# Patient Record
Sex: FEMALE | Race: WHITE
[De-identification: names, ages, dates, MRNs, and addresses within clinical notes are randomized per-mention and may not be internally consistent; named-entity substitution may affect disease eponyms.]

---

## 2018-05-20 ENCOUNTER — HOSPITAL ENCOUNTER (EMERGENCY)
Dept: HOSPITAL 97 - ER | Age: 38
Discharge: HOME | End: 2018-05-20
Payer: COMMERCIAL

## 2018-05-20 VITALS — SYSTOLIC BLOOD PRESSURE: 132 MMHG | DIASTOLIC BLOOD PRESSURE: 90 MMHG

## 2018-05-20 VITALS — TEMPERATURE: 98.3 F

## 2018-05-20 VITALS — OXYGEN SATURATION: 99 %

## 2018-05-20 DIAGNOSIS — F41.9: Primary | ICD-10-CM

## 2018-05-20 LAB
ALBUMIN SERPL BCP-MCNC: 4.8 G/DL (ref 3.2–5.5)
ALP SERPL-CCNC: 68 IU/L (ref 42–121)
ALT SERPL W P-5'-P-CCNC: 20 IU/L (ref 10–60)
AST SERPL W P-5'-P-CCNC: 25 IU/L (ref 10–42)
BUN BLD-MCNC: 14 MG/DL (ref 6–20)
GLUCOSE SERPLBLD-MCNC: 120 MG/DL (ref 65–120)
HCT VFR BLD CALC: 42.3 % (ref 36–45)
INR BLD: 1
LIPASE SERPL-CCNC: 19 U/L (ref 22–51)
LYMPHOCYTES # SPEC AUTO: 3 K/UL (ref 0.7–4.9)
MAGNESIUM SERPL-MCNC: 2.1 MG/DL (ref 1.8–2.5)
MCH RBC QN AUTO: 31 PG (ref 27–35)
MCV RBC: 93.2 FL (ref 80–100)
PMV BLD: 8.8 FL (ref 7.6–11.3)
POTASSIUM SERPL-SCNC: 3.5 MEQ/L (ref 3.6–5)
RBC # BLD: 4.53 M/UL (ref 3.86–4.86)

## 2018-05-20 PROCEDURE — 36415 COLL VENOUS BLD VENIPUNCTURE: CPT

## 2018-05-20 PROCEDURE — 85610 PROTHROMBIN TIME: CPT

## 2018-05-20 PROCEDURE — 96374 THER/PROPH/DIAG INJ IV PUSH: CPT

## 2018-05-20 PROCEDURE — 84484 ASSAY OF TROPONIN QUANT: CPT

## 2018-05-20 PROCEDURE — 93005 ELECTROCARDIOGRAM TRACING: CPT

## 2018-05-20 PROCEDURE — 83880 ASSAY OF NATRIURETIC PEPTIDE: CPT

## 2018-05-20 PROCEDURE — 85025 COMPLETE CBC W/AUTO DIFF WBC: CPT

## 2018-05-20 PROCEDURE — 81003 URINALYSIS AUTO W/O SCOPE: CPT

## 2018-05-20 PROCEDURE — 74177 CT ABD & PELVIS W/CONTRAST: CPT

## 2018-05-20 PROCEDURE — 76705 ECHO EXAM OF ABDOMEN: CPT

## 2018-05-20 PROCEDURE — 81025 URINE PREGNANCY TEST: CPT

## 2018-05-20 PROCEDURE — 99285 EMERGENCY DEPT VISIT HI MDM: CPT

## 2018-05-20 PROCEDURE — 83735 ASSAY OF MAGNESIUM: CPT

## 2018-05-20 PROCEDURE — 80048 BASIC METABOLIC PNL TOTAL CA: CPT

## 2018-05-20 PROCEDURE — 85379 FIBRIN DEGRADATION QUANT: CPT

## 2018-05-20 PROCEDURE — 80076 HEPATIC FUNCTION PANEL: CPT

## 2018-05-20 PROCEDURE — 83690 ASSAY OF LIPASE: CPT

## 2018-05-20 PROCEDURE — 71045 X-RAY EXAM CHEST 1 VIEW: CPT

## 2018-05-20 NOTE — RAD REPORT
EXAM DESCRIPTION:  Patricia Single View5/20/2018 11:43 am

 

CLINICAL HISTORY:  Chest pain

 

COMPARISON:  none

 

FINDINGS:   The lungs appear clear of acute infiltrate. The heart is normal size

 

IMPRESSION:   No acute abnormalities displayed

## 2018-05-20 NOTE — EKG
Test Date:    2018-05-20               Test Time:    11:11:27

Technician:   MEENAKSHI                                    

                                                     

MEASUREMENT RESULTS:                                       

Intervals:                                           

Rate:         85                                     

DC:           138                                    

QRSD:         70                                     

QT:           372                                    

QTc:          442                                    

Axis:                                                

P:            74                                     

DC:           138                                    

QRS:          75                                     

T:            60                                     

                                                     

INTERPRETIVE STATEMENTS:                                       

                                                     

Normal sinus rhythm

Normal ECG

No previous ECG available for comparison



Electronically Signed On 05-20-18 14:03:04 CDT by Daniel Yoon

## 2018-05-20 NOTE — ER
Nurse's Notes                                                                                     

 [facility]                                                                

Name: Dorothy Marcelnio                                                                             

Age: 37 yrs                                                                                       

Sex: Female                                                                                       

: 1980                                                                                   

MRN: [id number]                                                                                   

Arrival Date: 2018                                                                          

Time: 10:33                                                                                       

Account#: [id number]                                                                            

Bed 19                                                                                            

Private MD: Out, [facility]                                                                    

Diagnosis: Anxiety disorder, unspecified                                                          

                                                                                                  

Presentation:                                                                                     

                                                                                             

10:36 Presenting complaint: Patient states: I was recently diagnosed with MVP but I have been la1 

      having worsening SOB since then (about 2 weeks ago). Transition of care: patient was        

      not received from another setting of care. Onset of symptoms was May 20, 2018. Initial      

      Sepsis Screen: Does the patient meet any 2 criteria? No. Patient's initial sepsis           

      screen is negative. Does the patient have a suspected source of infection? No.              

      Patient's initial sepsis screen is negative. Care prior to arrival: None.                   

10:36 Method Of Arrival: Ambulatory                                                           la1 

10:36 Acuity: EVI 3                                                                           la1 

                                                                                                  

Historical:                                                                                       

- Allergies:                                                                                      

10:35 No Known Allergies;                                                                     la1 

- PMHx:                                                                                           

10:35 mvp; Anxiety;                                                                           la1 

                                                                                                  

- Immunization history:: Adult Immunizations up to date.                                          

- Social history:: Smoking status: Patient/guardian denies using tobacco.                         

                                                                                                  

                                                                                                  

Screenin:37 Abuse screen: Denies threats or abuse. Denies injuries from another. Nutritional        ch  

      screening: No deficits noted. Tuberculosis screening: No symptoms or risk factors           

      identified. Fall Risk None identified.                                                      

                                                                                                  

Assessment:                                                                                       

11:37 General: Appears in no apparent distress. comfortable, Behavior is anxious. Pain:       ch  

      Denies pain. Cardiovascular: Heart tones S1 S2 present Capillary refill < 3 seconds in      

      bilateral fingers toes Clubbing of nail beds is absent Patient's skin is warm and dry.      

      Pulses are all present. Edema is absent. Rhythm is sinus rhythm. Respiratory: Airway is     

      patent Respiratory effort is even, unlabored, Breath sounds are clear bilaterally.          

      Onset: The symptoms/episode began/occurred gradually. GI: No signs and/or symptoms were     

      reported involving the gastrointestinal system. Abdomen is round non-distended. : No      

      signs and/or symptoms were reported regarding the genitourinary system. Derm: Skin is       

      pink, warm \T\ dry.                                                                         

11:40 Reassessment: gallbladder US completed.                                                 ch  

12:47 Reassessment: Patient appears in no apparent distress at this time. Patient and/or      ch  

      family updated on plan of care and expected duration. Pain level reassessed. Patient is     

      alert, oriented x 3, equal unlabored respirations, skin warm/dry/pink. pt refuses GI        

      coctail now, states she want to know what is wrong with her. I tell pt that we still        

      have studies pending. pt transported to CT via wheelchair now. aaox4, no s/s of             

      distress. pt appears anxious, and speak rapidly. pt states she feelsslightly better.        

13:00 Reassessment: Patient appears in no apparent distress at this time. No changes from     ch  

      previously documented assessment.                                                           

14:05 Reassessment: Patient appears in no apparent distress at this time. Patient and/or      ch  

      family updated on plan of care and expected duration. Pain level reassessed. pt states      

      she wants to go home.pt medicated per orders, and discharged.                               

14:20 Reassessment: Patient appears in no apparent distress at this time. Patient and/or      ch  

      family updated on plan of care and expected duration. Pain level reassessed. Patient is     

      alert, oriented x 3, equal unlabored respirations, skin warm/dry/pink.                      

                                                                                                  

Vital Signs:                                                                                      

10:37  / 100; Pulse 85; Resp 19; Temp 97.3; Pulse Ox 100% on R/A;                       la1 

11:37 Pulse 76; Resp 14; Pulse Ox 99% on R/A; Pain 0/10;                                      ch  

12:30  / 78; Pulse 62; Resp 14; Temp 97.8; Pulse Ox 99% on R/A; Pain 0/10;              ch  

13:43  / 76; Pulse 84; Resp 16; Temp 98.3; Pulse Ox 99% on R/A; Pain 0/10;              ch  

14:05  / 90; Pulse 73; Resp 15; Temp 98.3; Pulse Ox 99% on R/A; Pain 0/10;              ch  

                                                                                                  

ED Course:                                                                                        

10:33 Patient arrived in ED.                                                                  sb2 

10:35 Out, of Town is Private Physician.                                                      sb2 

10:37 Triage completed.                                                                       la1 

10:37 Arm band placed on left wrist.                                                          la1 

10:41 Андрей Malik PA is PHCP.                                                               jr8 

10:41 Delta Rodrigez MD is Attending Physician.                                             jr8 

11:28 Initial lab(s) drawn, by me, sent to lab. EKG done, by ED staff, reviewed by Андрей MEDINA. Inserted saline lock: 20 gauge in left antecubital area, using aseptic           

      technique. Blood collected.                                                                 

11:31 Aleja Harrington, RN is Primary Nurse.                                                ch  

11:37 No apparent distress. Resting quietly.                                                  ch  

11:37 Patient has correct armband on for positive identification. Bed in low position. Call     

      light in reach. Side rails up X 1. Adult w/ patient. Cardiac monitor on. Pulse ox on.       

      NIBP on. Warm blanket given.                                                                

11:37 No provider procedures requiring assistance completed.                                  ch  

11:39 X-ray completed. Portable x-ray completed in exam room.                                 jw2 

11:40 XRAY Chest (1 view) In Process Unspecified.                                             EDMS

11:43 Ultrasound completed. Patient tolerated well.                                           sg3 

12:53 CT Abd/Pelvis - W/Contrast In Process Unspecified.                                      EDMS

12:54 CT completed. Patient tolerated procedure well. Patient moved back from CT.             cw1 

14:05 IV discontinued, intact, bleeding controlled, No redness/swelling at site. Pressure     ch  

      dressing applied.                                                                           

                                                                                                  

Administered Medications:                                                                         

13:20 Not Given (Physician Discretion): GI Cocktail without Donnatal - (Maalox Suspension 30  jr8 

      ml, Lidocaine Liquid 2 % 15 ml) PO once                                                     

13:55 Drug: Ativan 1 mg Route: IVP; Site: left antecubital;                                     

14:20 Follow up: Response: No adverse reaction; Marked relief of symptoms                     ch  

                                                                                                  

                                                                                                  

Outcome:                                                                                          

14:03 Discharge ordered by MD. limon 

14:21 Discharged to home ambulatory, with family.                                             ch  

14:21 Condition: stable                                                                           

14:21 Discharge instructions given to patient, family, Instructed on discharge instructions,      

      follow up and referral plans. Demonstrated understanding of instructions, follow-up         

      care.                                                                                       

14:21 Patient left the ED.                                                                    ch  

                                                                                                  

Signatures:                                                                                       

Dispatcher [employer]                           EDMS                                                 

Aleja Harrington, RN                  Man Fernandez ch, Crystal                             cw1                                                  

Андрей Malik PA PA   jr8                                                  

Nishant Vasquez RN                         RN   Isabelle Ballard                                jw2                                                  

Lorena Randall                               sg3                                                  

India Philippe                               sb2                                                  

                                                                                                  

Corrections: (The following items were deleted from the chart)                                    

11:47 11:47 In radiology for [facility]. EDMS                                 sg3 

                                                                                                  

**************************************************************************************************

## 2018-05-20 NOTE — RAD REPORT
EXAM DESCRIPTION:  CT - Abdomen   Pelvis W Contrast - 5/20/2018 12:53 pm

 

CLINICAL HISTORY:  Abdominal pain with nausea.

 

COMPARISON:  March 2017

 

TECHNIQUE:  Computed axial tomography of the abdomen pelvis was obtained. 100 cc Isovue-300 was admin
istered intravenously. Oral contrast was not requested which limits evaluation of bowel.

 

All CT scans are performed using dose optimization technique as appropriate and may include automated
 exposure control or mA/KV adjustment according to patient size.

 

FINDINGS:  The liver, spleen, pancreas, adrenal and kidneys appear unremarkable.

 

There is no evidence of diverticulitis.

 

IMPRESSION:  No acute abnormality is displayed.

## 2018-05-20 NOTE — EDPHYS
Physician Documentation                                                                           

 [facility]                                                                

Name: Dorothy Marcelino                                                                             

Age: 37 yrs                                                                                       

Sex: Female                                                                                       

: 1980                                                                                   

MRN: [id number]                                                                                   

Arrival Date: 2018                                                                          

Time: 10:33                                                                                       

Account#: [id number]                                                                            

Bed 19                                                                                            

Private MD: Out, [facility]                                                                    

ED Physician Delta Rodrigez                                                                      

HPI:                                                                                              

                                                                                             

11:12 This 37 yrs old  Female presents to ER via Ambulatory with complaints of       jr8 

      Shortness Of Breath.                                                                        

11:12 Onset: The symptoms/episode began/occurred gradually. Associated signs and symptoms:    jr8 

      Pertinent positives: abdominal discomfort . Severity of symptoms: At their worst the        

      symptoms were moderate in the emergency department the symptoms are unchanged. The          

      patient has not experienced similar symptoms in the past. The patient has been recently     

      seen by a physician:. Patient stated that she had originally gone to [facility] for shortness of breath. Diagnosed with MVP and anxiety. Had follow up with        

      cardiology. Echocardiogram, stress test, and angiogram was performed. MVP diagnosed but     

      of mild variance. No other acute cardiac findings noted. Given Bystolic and Klonopin.       

      Stated that she still continues to have tight bloating epigastric feeling and a sense       

      of not being able to take a deep breath along with having shortness of breath. Klonopin     

      not helping with the shortness of breath .                                                  

                                                                                                  

Historical:                                                                                       

- Allergies:                                                                                      

10:35 No Known Allergies;                                                                     la1 

- PMHx:                                                                                           

10:35 mvp; Anxiety;                                                                           la1 

                                                                                                  

- Immunization history:: Adult Immunizations up to date.                                          

- Social history:: Smoking status: Patient/guardian denies using tobacco.                         

                                                                                                  

                                                                                                  

ROS:                                                                                              

11:12 Eyes: Negative for injury, pain, redness, and discharge, ENT: Negative for injury,      jr8 

      pain, and discharge, Neck: Negative for injury, pain, and swelling, Cardiovascular:         

      Negative for chest pain, palpitations, and edema, Back: Negative for injury and pain,       

      MS/Extremity: Negative for injury and deformity, Skin: Negative for injury, rash, and       

      discoloration, Neuro: Negative for headache, weakness, numbness, tingling, and seizure.     

11:12 Respiratory: Positive for shortness of breath, Negative for cough, dyspnea on exertion,     

      hemoptysis, orthopnea, pleurisy, sputum production, wheezing.                               

11:12 Abdomen/GI: Positive for nausea, abdominal distension.                                      

                                                                                                  

Exam:                                                                                             

11:12 Eyes:  Pupils equal round and reactive to light, extra-ocular motions intact.  Lids and jr8 

      lashes normal.  Conjunctiva and sclera are non-icteric and not injected.  Cornea within     

      normal limits.  Periorbital areas with no swelling, redness, or edema. ENT:  Nares          

      patent. No nasal discharge, no septal abnormalities noted.  Tympanic membranes are          

      normal and external auditory canals are clear.  Oropharynx with no redness, swelling,       

      or masses, exudates, or evidence of obstruction, uvula midline.  Mucous membranes           

      moist. Neck:  Trachea midline, no thyromegaly or masses palpated, and no cervical           

      lymphadenopathy.  Supple, full range of motion without nuchal rigidity, or vertebral        

      point tenderness.  No Meningismus. Cardiovascular:  Regular rate and rhythm with a          

      normal S1 and S2.  No gallops, murmurs, or rubs.  Normal PMI, no JVD.  No pulse             

      deficits. Respiratory:  Lungs have equal breath sounds bilaterally, clear to                

      auscultation and percussion.  No rales, rhonchi or wheezes noted.  No increased work of     

      breathing, no retractions or nasal flaring. Back:  No spinal tenderness.  No                

      costovertebral tenderness.  Full range of motion. Skin:  Warm, dry with normal turgor.      

      Normal color with no rashes, no lesions, and no evidence of cellulitis. MS/ Extremity:      

      Pulses equal, no cyanosis.  Neurovascular intact.  Full, normal range of motion. Neuro:     

       Awake and alert, GCS 15, oriented to person, place, time, and situation.  Cranial          

      nerves II-XII grossly intact.  Motor strength 5/5 in all extremities.  Sensory grossly      

      intact.  Cerebellar exam normal.  Normal gait.                                              

11:12 Constitutional: The patient appears alert, awake, anxious.                                  

11:12 Abdomen/GI: Inspection: abdomen appears normal, Bowel sounds: active, all quadrants,        

      Palpation: soft, in all quadrants, mild abdominal tenderness, in the epigastric area        

      and right upper quadrant, mass, is not appreciated, rebound tenderness, is not              

      appreciated, voluntary guarding, is not appreciated, involuntary guarding, is not           

      appreciated, no appreciated organomegaly, Indicators: McBurney's point is not tender,       

      Gallardo's sign is negative, Rovsing's sign is negative, Obturator sign is negative,          

      Psoas sign is negative, Liver: no appreciated palpable abnormalities, tenderness, is        

      not appreciated.                                                                            

                                                                                                  

Vital Signs:                                                                                      

10:37  / 100; Pulse 85; Resp 19; Temp 97.3; Pulse Ox 100% on R/A;                       la1 

11:37 Pulse 76; Resp 14; Pulse Ox 99% on R/A; Pain 0/10;                                      ch  

12:30  / 78; Pulse 62; Resp 14; Temp 97.8; Pulse Ox 99% on R/A; Pain 0/10;              ch  

13:43  / 76; Pulse 84; Resp 16; Temp 98.3; Pulse Ox 99% on R/A; Pain 0/10;              ch  

14:05  / 90; Pulse 73; Resp 15; Temp 98.3; Pulse Ox 99% on R/A; Pain 0/10;              ch  

                                                                                                  

MDM:                                                                                              

10:41 Patient medically screened.                                                             [facility] 

14:02 Data reviewed: vital signs, nurses notes, lab test result(s), EKG, radiologic studies,  [facility] 

      CT scan, plain films, ultrasound, and as a result, I will discharge patient. Data           

      interpreted: Pulse oximetry: on room air is 99 %. Interpretation: normal. Counseling: I     

      had a detailed discussion with the patient and/or guardian regarding: the historical        

      points, exam findings, and any diagnostic results supporting the discharge/admit            

      diagnosis, lab results, radiology results, the need for outpatient follow up, a family      

      practitioner, a gastroenterologist, to return to the emergency department if symptoms       

      worsen or persist or if there are any questions or concerns that arise at home.             

                                                                                                  

                                                                                             

10:59 Order name: Basic Metabolic Panel; Complete Time: 12:08                                                                                                                              

10:59 Order name: BNP; Complete Time: 12:20                                                   [facility] 

                                                                                             

10:59 Order name: CBC with Diff; Complete Time: 11:49                                                                                                                                      

10:59 Order name: LFT's; Complete Time: 12:08                                                                                                                                              

10:59 Order name: Magnesium; Complete Time: 12:08                                                                                                                                          

10:59 Order name: PT-INR; Complete Time: 11:55                                                                                                                                             

10:59 Order name: Troponin (emerg Dept Use Only); Complete Time: 12:08                                                                                                                     

10:59 Order name: XRAY Chest (1 view); Complete Time: 12:37                                                                                                                                

10:59 Order name: Lipase; Complete Time: 12:08                                                                                                                                             

10:59 Order name: DD; Complete Time: 11:55                                                                                                                                                 

11:18 Order name: US Abdomen Limited; Complete Time: 13:08                                                                                                                                 

11:52 Order name: Urine Dipstick--Ancillary (enter results); Complete Time: 12:20             ag  

                                                                                             

11:52 Order name: Urine Pregnancy--Ancillary (enter results); Complete Time: 12:20            ag  

                                                                                             

12:10 Order name: CT Abd/Pelvis - W/Contrast; Complete Time: 13:                                                                                             

10:59 Order name: EKG; Complete Time: 10:59                                                                                                                                                

10:59 Order name: Cardiac monitoring; Complete Time: 11:37                                                                                                                                 

10:59 Order name: EKG - Nurse/Tech; Complete Time: 11:37                                                                                                                                   

10:59 Order name: IV Saline Lock; Complete Time: 11:37                                                                                                                                     

10:59 Order name: Labs collected and sent; Complete Time: 11:37                                                                                                                            

10:59 Order name: O2 Per Protocol; Complete Time: 11:37                                                                                                                                    

10:59 Order name: O2 Sat Monitoring; Complete Time: 11:37                                                                                                                                  

10:59 Order name: Urine Dipstick-Ancillary (obtain specimen); Complete Time: 11:58                                                                                                         

10:59 Order name: Urine Pregnancy Test (obtain specimen); Complete Time: 11:58                jr8 

                                                                                                  

Administered Medications:                                                                         

13:20 Not Given (Physician Discretion): GI Cocktail without Donnatal - (Maalox Suspension 30  jr8 

      ml, Lidocaine Liquid 2 % 15 ml) PO once                                                     

13:55 Drug: Ativan 1 mg Route: IVP; Site: left antecubital;                                     

14:20 Follow up: Response: No adverse reaction; Marked relief of symptoms                     ch  

                                                                                                  

                                                                                                  

Disposition:                                                                                      

                                                                                             

09:18 Co-signature as Attending Physician, Delta Rodrigez MD I agree with the assessment and  garima 

      plan of care.                                                                               

                                                                                                  

Disposition:                                                                                      

18 14:03 Discharged to Home. Impression: Anxiety disorder, unspecified.                     

- Condition is Stable.                                                                            

- Discharge Instructions: Panic Attacks, Generalized Anxiety Disorder.                            

                                                                                                  

- Medication Reconciliation Form, Thank You Letter, Antibiotic Education, Prescription            

  Opioid Use form.                                                                                

- Follow up: Private Physician; When: 2 - 3 days; Reason: Recheck today's complaints,             

  Continuance of care, Re-evaluation by your physician.                                           

- Problem is new.                                                                                 

- Symptoms have improved.                                                                         

                                                                                                  

                                                                                                  

                                                                                                  

Signatures:                                                                                       

Dispatcher [employer]                           Aleja Keating, RN                  RN   Delta Altamirano MD MD cha Roszak, Josh, PA PA   jr8                                                  

Nishant Vasquez RN                         RN   la1                                                  

                                                                                                  

Corrections: (The following items were deleted from the chart)                                    

                                                                                             

14:21 14:03 2018 14:03 Discharged to Home. Impression: Anxiety disorder, unspecified.     

      Condition is Stable. Forms are Medication Reconciliation Form, Thank You Letter,            

      Antibiotic Education, Prescription Opioid Use. Follow up: Private Physician; When: 2 -      

      3 days; Reason: Recheck today's complaints, Continuance of care, Re-evaluation by your      

      physician. Problem is new. Symptoms have improved. jr8                                      

                                                                                                  

**************************************************************************************************

## 2018-05-20 NOTE — RAD REPORT
EXAM DESCRIPTION:  US - Abdomen Exam Limited - 5/20/2018 11:47 am

 

CLINICAL HISTORY:  Abdominal pain.

 

COMPARISON:  None.

 

FINDINGS:   The gallbladder wall is not thickened. A gallstone is not seen.

 

The biliary tree is normal caliber.

 

IMPRESSION:  Unremarkable gallbladder ultrasound.

## 2019-07-29 LAB
HCT VFR BLD CALC: 41.5 % (ref 36–45)
LYMPHOCYTES # SPEC AUTO: 2.2 K/UL (ref 0.7–4.9)
PMV BLD: 9.3 FL (ref 7.6–11.3)
RBC # BLD: 4.35 M/UL (ref 3.86–4.86)
UA COMPLETE W REFLEX CULTURE PNL UR: (no result)
UA DIPSTICK W REFLEX MICRO PNL UR: (no result)

## 2019-07-31 ENCOUNTER — HOSPITAL ENCOUNTER (OUTPATIENT)
Dept: HOSPITAL 97 - OR | Age: 39
Discharge: HOME | End: 2019-07-31
Attending: OBSTETRICS & GYNECOLOGY
Payer: COMMERCIAL

## 2019-07-31 VITALS — OXYGEN SATURATION: 100 % | DIASTOLIC BLOOD PRESSURE: 65 MMHG | SYSTOLIC BLOOD PRESSURE: 95 MMHG | TEMPERATURE: 98.2 F

## 2019-07-31 DIAGNOSIS — I34.1: ICD-10-CM

## 2019-07-31 DIAGNOSIS — F41.9: ICD-10-CM

## 2019-07-31 DIAGNOSIS — N83.8: ICD-10-CM

## 2019-07-31 DIAGNOSIS — M54.5: ICD-10-CM

## 2019-07-31 DIAGNOSIS — N94.6: ICD-10-CM

## 2019-07-31 DIAGNOSIS — N80.0: Primary | ICD-10-CM

## 2019-07-31 DIAGNOSIS — N92.0: ICD-10-CM

## 2019-07-31 DIAGNOSIS — F17.210: ICD-10-CM

## 2019-07-31 DIAGNOSIS — Z79.899: ICD-10-CM

## 2019-07-31 DIAGNOSIS — F32.9: ICD-10-CM

## 2019-07-31 DIAGNOSIS — N93.9: ICD-10-CM

## 2019-07-31 DIAGNOSIS — N73.6: ICD-10-CM

## 2019-07-31 PROCEDURE — 36415 COLL VENOUS BLD VENIPUNCTURE: CPT

## 2019-07-31 PROCEDURE — 88302 TISSUE EXAM BY PATHOLOGIST: CPT

## 2019-07-31 PROCEDURE — 86901 BLOOD TYPING SEROLOGIC RH(D): CPT

## 2019-07-31 PROCEDURE — 58661 LAPAROSCOPY REMOVE ADNEXA: CPT

## 2019-07-31 PROCEDURE — 49321 LAPAROSCOPY BIOPSY: CPT

## 2019-07-31 PROCEDURE — 85025 COMPLETE CBC W/AUTO DIFF WBC: CPT

## 2019-07-31 PROCEDURE — 88305 TISSUE EXAM BY PATHOLOGIST: CPT

## 2019-07-31 PROCEDURE — 0UJD8ZZ INSPECTION OF UTERUS AND CERVIX, VIA NATURAL OR ARTIFICIAL OPENING ENDOSCOPIC: ICD-10-PCS

## 2019-07-31 PROCEDURE — 58558 HYSTEROSCOPY BIOPSY: CPT

## 2019-07-31 PROCEDURE — 81015 MICROSCOPIC EXAM OF URINE: CPT

## 2019-07-31 PROCEDURE — 81003 URINALYSIS AUTO W/O SCOPE: CPT

## 2019-07-31 PROCEDURE — 86850 RBC ANTIBODY SCREEN: CPT

## 2019-07-31 PROCEDURE — 0UDB7ZX EXTRACTION OF ENDOMETRIUM, VIA NATURAL OR ARTIFICIAL OPENING, DIAGNOSTIC: ICD-10-PCS

## 2019-07-31 PROCEDURE — 0WBF4ZX EXCISION OF ABDOMINAL WALL, PERCUTANEOUS ENDOSCOPIC APPROACH, DIAGNOSTIC: ICD-10-PCS

## 2019-07-31 PROCEDURE — 0UT64ZZ RESECTION OF LEFT FALLOPIAN TUBE, PERCUTANEOUS ENDOSCOPIC APPROACH: ICD-10-PCS

## 2019-07-31 PROCEDURE — 86900 BLOOD TYPING SEROLOGIC ABO: CPT

## 2019-07-31 PROCEDURE — 81025 URINE PREGNANCY TEST: CPT

## 2019-07-31 RX ADMIN — HYDROCODONE BITARTRATE AND ACETAMINOPHEN ONE TAB: 5; 325 TABLET ORAL at 12:24

## 2019-07-31 RX ADMIN — HYDROMORPHONE HYDROCHLORIDE ONE MG: 1 INJECTION, SOLUTION INTRAMUSCULAR; INTRAVENOUS; SUBCUTANEOUS at 09:47

## 2019-07-31 RX ADMIN — HYDROMORPHONE HYDROCHLORIDE ONE MG: 1 INJECTION, SOLUTION INTRAMUSCULAR; INTRAVENOUS; SUBCUTANEOUS at 09:52

## 2019-07-31 RX ADMIN — HYDROMORPHONE HYDROCHLORIDE ONE MG: 1 INJECTION, SOLUTION INTRAMUSCULAR; INTRAVENOUS; SUBCUTANEOUS at 10:20

## 2019-07-31 RX ADMIN — HYDROCODONE BITARTRATE AND ACETAMINOPHEN ONE TAB: 5; 325 TABLET ORAL at 11:00

## 2019-07-31 RX ADMIN — HYDROMORPHONE HYDROCHLORIDE ONE MG: 1 INJECTION, SOLUTION INTRAMUSCULAR; INTRAVENOUS; SUBCUTANEOUS at 10:18

## 2019-07-31 NOTE — XMS REPORT
Patient Summary Document

 Created on:2019



Patient:LEXI WALKER

Sex:Female

:1980

External Reference #:[id number]





Demographics







 Address  [address] [place] [address]

 

 Home Phone  [phone]

 

 Work Phone  [phone]

 

 Email Address  DECLINED

 

 Preferred Language  Unknown

 

 Marital Status  Unknown

 

 Mandaeism Affiliation  Unknown

 

 Race  Unknown

 

 Additional Race(s)  Unavailable

 

 Ethnic Group  Unknown









Author







 Organization  [facility]

 

 Address  [address]



   [place] [address]

 

 Phone  [phone]









Care Team Providers







 Name  Role  Phone

 

 Unavailable  Unavailable  Unavailable









Problems

This patient has no known problems.



Allergies, Adverse Reactions, Alerts

This patient has no known allergies or adverse reactions.



Medications

This patient has no known medications.



Results







 Test Description  Test Time  Test Comments  Text Results  Atomic Results  
Result Comments









 Automated Differential  2019 23:01:16    









   Test Item  Value  Reference Range  Comments









 Neutro Auto (test code=Neutro Auto)  71.9 %  36.0-70.0  

 

 Lymph Auto (test code=Lymph Auto)  22.8 %  12.0-44.0  

 

 Mono Auto (test code=Mono Auto)  4.4 %  0.0-11.0  

 

 Eos, Auto (test code=Eos, Auto)  0.2 %  0.0-7.0  

 

 Basophil Auto (test code=Basophil Auto)  0.5 %  0.0-2.0  

 

 Neutro Absolute (test code=Neutro Absolute)  6.4 x10  1.6-7.4  

 

 Lymph Absolute (test code=Lymph Absolute)  2.02 x10  .50-4.60  

 

 Mono Absolute (test code=Mono Absolute)  .39 x10  .00-1.20  

 

 Eos Absolute (test code=Eos Absolute)  0.02 x10  0.00-0.74  

 

 Baso Absolute (test code=Baso Absolute)  0.04 x10  0.00-0.21  



IG [id number] 23:01:16





 Test Item  Value  Reference Range  Comments

 

 IG (test code=IG)  0.2 %  0.0-5.0  

 

 IG Abs (test code=IG Abs)  0 x10    



Complete Blood Count with [id number] 23:01:15





 Test Item  Value  Reference Range  Comments

 

 WBC (test code=WBC)  8.9 x10  4.4-10.5  

 

 RBC (test code=RBC)  4.26 x10  3.75-5.20  

 

 Hgb (test code=Hgb)  13.6 g/dL  12.2-14.8  

 

 MCV (test code=MCV)  96.20 fL  80..00  

 

 Hct (test code=Hct)  41.0 %  36.5-44.4  

 

 MCHC (test code=MCHC)  33.20 g/dL  32.00-37.50  

 

 RDW CV (test code=RDW CV)  12.7 %  11.5-14.5  

 

 MCH (test code=MCH)  31.9 pg  27.0-32.5  

 

 Platelets (test  265.0 x10  140.0-440.0  



 code=Platelets)      

 

 MPV (test code=MPV)  11.3 fL    

 

 Slide Review (test code=Slide  Auto  Auto  Result created by



 Review)      [id number]

 

 nRBC (test code=nRBC)  0    

 

 NRBC Abs (test code=NRBC Abs)  0.00 x10    

 

 IPF (test code=IPF)  0 %    



Lipid [id number] 22:51:51





 Test Item  Value  Reference Range  Comments

 

 Cholesterol Total (test  152 mg/dL  0-200  RISK OF HEART DISEASEPublished



 code=Cholesterol Total)      by American Heart Association



       Analyte  Optimal Borderline



       Increased RiskCHOL  <200



       200-239 >240TRIG  <150 150-199



       >200HDL Male  >60  <40HDL



       Female  >60  <50LDL  <100



       130-159 >160LDL  Near optimal



       is 100-129

 

 Triglycerides (test  64 mg/dL  9-200  



 code=Triglycerides)      

 

 HDL (test code=HDL)  52 mg/dL  50-60  

 

 LDL (test code=LDL)  87 mg/dL  0-130  The equation being used in this



       calculation is  LDL=(Chol -



       HDL) - (Trig / 5)

 

 VLDL (test code=VLDL)  13 mg/dL  5-40  The equation being used in this



       calculation is  VLDL=Trig / 5

 

 Chol/HDL (test  2.9 ratio  0.0-4.4  



 code=Chol/HDL)      

 

 LDL/HDL Ratio (test  2    The equation being used in this



 code=LDL/HDL Ratio)      calculation is  LDL/HDL



       Ratio=LDL Calc/HDL Chol



Comprehensive Metabolic [id number] 22:51:50





 Test Item  Value  Reference Range  Comments

 

 Sodium Level (test code=Sodium Level)  137.0 mmol/L  135.0-145.0  

 

 Potassium Level (test code=Potassium Level)  4.6 mmol/L  3.5-5.1  

 

 Chloride Level (test code=Chloride Level)  103 mmol/L    

 

 CO2 (test code=CO2)  27 mmol/L  22-29  

 

 Anion Gap (test code=Anion Gap)  7 mmol/L  7-16  

 

 BUN (test code=BUN)  15.50 mg/dL  6.00-20.00  

 

 Creatinine Level (test code=Creatinine Level)  0.50 mg/dL  0.50-0.90  

 

 BUN/Creat Ratio (test code=BUN/Creat Ratio)  31    

 

 Glucose Level (test code=Glucose Level)  95 mg/dL    

 

 Calcium Level (test code=Calcium Level)  9.3 mg/dL  8.3-10.5  

 

 Alk Phos (test code=Alk Phos)  52 U/L    

 

 Bilirubin Total (test code=Bilirubin Total)  <0.1 mg/dL  0.1-0.9  

 

 Albumin Level (test code=Albumin Level)  4.2 g/dL  3.5-5.2  

 

 Protein Total (test code=Protein Total)  6.6 g/dL  6.4-8.3  

 

 ALT (test code=ALT)  13 U/L  1-33  

 

 AST (test code=AST)  16 U/L  1-32  

 

 Globulin (test code=Globulin)  2.4 g/dL  2.9-3.1  

 

 A/G Ratio (test code=A/G Ratio)  1.8 ratio    



Comprehensive Metabolic [id number] 22:51:50





 Test Item  Value  Reference Range  Comments

 

 Sodium Level (test  137.0 mmol/L  135.0-145.0  



 code=Sodium Level)      

 

 Potassium Level (test  4.6 mmol/L  3.5-5.1  



 code=Potassium Level)      

 

 Chloride Level (test  103 mmol/L    



 code=Chloride Level)      

 

 CO2 (test code=CO2)  27 mmol/L  22-29  

 

 Anion Gap (test  7 mmol/L  7-16  



 code=Anion Gap)      

 

 BUN (test code=BUN)  15.50 mg/dL  6.00-20.00  

 

 Creatinine Level (test  0.50 mg/dL  0.50-0.90  



 code=Creatinine Level)      

 

 BUN/Creat Ratio (test  31    



 code=BUN/Creat Ratio)      

 

 Glucose Level (test  95 mg/dL    



 code=Glucose Level)      

 

 Calcium Level (test  9.3 mg/dL  8.3-10.5  



 code=Calcium Level)      

 

 Alk Phos (test code=Alk  52 U/L    



 Phos)      

 

 Bilirubin Total (test  <0.1 mg/dL  0.1-0.9  



 code=Bilirubin Total)      

 

 Albumin Level (test  4.2 g/dL  3.5-5.2  



 code=Albumin Level)      

 

 Protein Total (test  6.6 g/dL  6.4-8.3  



 code=Protein Total)      

 

 ALT (test code=ALT)  13 U/L  1-33  

 

 AST (test code=AST)  16 U/L  1-32  

 

 Globulin (test  2.4 g/dL  2.9-3.1  



 code=Globulin)      

 

 A/G Ratio (test code=A/G  1.8 ratio    



 Ratio)      

 

 eGFR AA (test code=eGFR  >60 mL/min/1.73 m2    eGFR (estimated



 AA)      Glomerular Filtration



       Rate) is an estimated



       value, calculated from



       the patient's serum



       creatinine using the MDRD



       equation. It is NOT the



       patient's actual GFR. The



       eGFR provides a more



       clinically useful measure



       of kidney disease than



       serum creatinine



       alone.***This calculation



       takes sex and race into



       account, if the



       information is provided.



       If the race is not



       provided, and the patient



       is -American,



       multiply by 1.212. If sex



       is not provided, and the



       patient is female,



       multiply by 0.742.



       Results for patients <18



       years of age have not



       been validated by the



       MDRD study and should be



       interpreted with caution.



       eGFR Result



       Interpretation:eGFR >



       or=60 is in the Normal



       RangeeGFR &lt; 60 may



       mean kidney diseaseeGFR <



       15 may mean kidney



       failure*** Ranges



       recommended by the



       National Kidney



       Foundation,



       http://nkdep.nih.gov



Comprehensive Metabolic [id number] 22:51:50





 Test Item  Value  Reference Range  Comments

 

 Sodium Level (test  137.0 mmol/L  135.0-145.0  



 code=Sodium Level)      

 

 Potassium Level (test  4.6 mmol/L  3.5-5.1  



 code=Potassium Level)      

 

 Chloride Level (test  103 mmol/L    



 code=Chloride Level)      

 

 CO2 (test code=CO2)  27 mmol/L  22-29  

 

 Anion Gap (test  7 mmol/L  7-16  



 code=Anion Gap)      

 

 BUN (test code=BUN)  15.50 mg/dL  6.00-20.00  

 

 Creatinine Level (test  0.50 mg/dL  0.50-0.90  



 code=Creatinine Level)      

 

 BUN/Creat Ratio (test  31    



 code=BUN/Creat Ratio)      

 

 Glucose Level (test  95 mg/dL    



 code=Glucose Level)      

 

 Calcium Level (test  9.3 mg/dL  8.3-10.5  



 code=Calcium Level)      

 

 Alk Phos (test code=Alk  52 U/L    



 Phos)      

 

 Bilirubin Total (test  <0.1 mg/dL  0.1-0.9  



 code=Bilirubin Total)      

 

 Albumin Level (test  4.2 g/dL  3.5-5.2  



 code=Albumin Level)      

 

 Protein Total (test  6.6 g/dL  6.4-8.3  



 code=Protein Total)      

 

 ALT (test code=ALT)  13 U/L  1-33  

 

 AST (test code=AST)  16 U/L  1-32  

 

 Globulin (test  2.4 g/dL  2.9-3.1  



 code=Globulin)      

 

 A/G Ratio (test code=A/G  1.8 ratio    



 Ratio)      

 

 eGFR AA (test code=eGFR  >60 mL/min/1.73 m2    eGFR (estimated



 AA)      Glomerular Filtration



       Rate) is an estimated



       value, calculated from



       the patient's serum



       creatinine using the MDRD



       equation. It is NOT the



       patient's actual GFR. The



       eGFR provides a more



       clinically useful measure



       of kidney disease than



       serum creatinine



       alone.***This calculation



       takes sex and race into



       account, if the



       information is provided.



       If the race is not



       provided, and the patient



       is -American,



       multiply by 1.212. If sex



       is not provided, and the



       patient is female,



       multiply by 0.742.



       Results for patients <18



       years of age have not



       been validated by the



       MDRD study and should be



       interpreted with caution.



       eGFR Result



       Interpretation:eGFR >



       or=60 is in the Normal



       RangeeGFR &lt; 60 may



       mean kidney diseaseeGFR <



       15 may mean kidney



       failure*** Ranges



       recommended by the



       National Kidney



       Foundation,



       http://nkdep.nih.gov

 

 eGFR Non-AA (test  >60.00 mL/min/1.73    eGFR (estimated



 code=eGFR Non-AA)  m2    Glomerular Filtration



       Rate) is an estimated



       value, calculated from



       the patient's serum



       creatinine using the MDRD



       equation. It is NOT the



       patient's actual GFR. The



       eGFR provides a more



       clinically useful measure



       of kidney disease than



       serum creatinine



       alone.***This calculation



       takes sex and race into



       account, if the



       information is provided.



       If the race is not



       provided, and the patient



       is -American,



       multiply by 1.212. If sex



       is not provided, and the



       patient is female,



       multiply by 0.742.



       Results for patients <18



       years of age have not



       been validated by the



       MDRD study and should be



       interpreted with caution.



       eGFR Result



       Interpretation:eGFR >



       or=60 is in the Normal



       RangeeGFR &lt; 60 may



       mean kidney diseaseeGFR <



       15 may mean kidney



       failure*** Ranges



       recommended by the



       National Kidney



       Foundation,



       http://nkdep.nih.gov



XR Chest 1 View [id number] 11:17:46Patient:   LEXI WALKER          
                       MRN:    [id number] Date/Time201810:56 CDTReason 
for ExamShortness of breathReportEXAM: XR Chest 1 View FrontalLOCATION: 
[id number]: Shortness of breathCOMPARISON: None available time of 
interpretation.FINDINGS:Single view of the chest.No indwelling lines or 
tubes.No pneumothorax.The lungs are clear.   No pleural effusions are present. 
  The mediastinal contours are unremarkable.   No acute osseous findings are 
present.IMPRESSION:Noacute cardiopulmonary abnormality.***** Final *****
Dictated by:    MD Keene Haider ADictated DT/TM: 2018 11:17 amSigned by
:  MD Keene Haider ASignbijal (Electronic Signature):  2018 11:17 am

## 2019-07-31 NOTE — OP
Date of Procedure:  07/31/2019



Surgeon:  Bettie Coto MD



Assistant:  Rowan Ivey.



Preoperative Diagnoses:  Pelvic pain, menorrhagia, AUB-O/L/A, low back pain, and dysmenorrhea.



Postoperative Diagnoses:  Pelvic pain, menorrhagia, AUB-O/L/A, low back pain, dysmenorrhea, and left 
sigmoid adhesions.



Procedures Performed:  

1.Diagnostic hysteroscopy, D and C.

2.Diagnostic laparoscopy, lysis of adhesions of the sigmoid to the left lateral wall, then left salp
ingectomy after salpingolysis, and right lateral wall peritoneal biopsy.



Specimens:  Endometrial curettings, right lateral wall peritoneal biopsy, and left tube.



Anesthesia:  General endotracheal.



Complications:  None.



Drains:  None.



Patient's Condition:  Stable.



Findings:  The left tube was adhered to the sigmoid and then the sigmoid adhered to the lateral wall 
all the way down to the base of the left uterosacral.  Since the patient's pain was significant in th
is location and going to the back, decided that this would be important to take down the adhesions an
d these were taken down.  The tube was also removed because it had slight dilatation and the adhesion
s to the bowel.  There was a paratubal cyst as well.  The right tube appeared to be unremarkable, bot
h ovaries unremarkable.  Possible peritoneal lesions, not consistent with endometriosis, but suspicio
us in the right lateral wall immediately lateral to the right uterosacral ligament about 3 cm away fr
om the insertion of the uterosacral.  This was excised with scissors and sent for biopsy.



Indications:  Patient is a 39-year-old who presented to me with severe left lower quadrant pain and l
eft back pain.  She was in the ER and evaluated for her pain and on evaluation in the office, no evid
ence of any pelvic inflammatory disease, no adnexal masses were noted.  Her imaging from the ER was r
eviewed and there were no significant masses either.  Patient has some bowel symptoms including bloat
ing and constipation.  She was referred to GI for this.  However, since her pain has been cyclical wi
th heavy periods and the pelvic pain is recent in onset and severe, suspicion for endometriosis was h
igh.  She was told by Dr. Hurtado in the past that she could have endometriosis.  She also had back wor
kup by Dr. Cabrera who diagnosed her with bulging lumbar disk and she had gotten some injections and
 narcotics in the past as well.  So, we discussed overall all her findings and potential diagnosis of
 endometriosis.  If this was not present, then she would have to follow up with her GI and continue t
he workup.  No necessity for hysterectomy, which is what the patient had contemplated at one point.  
So, diagnostic hysteroscopy to make sure there is no atypia or malignancy and laparoscopy to rule in 
or rule out endometriosis and if there is any other pathology, I would address it at the time.



Description Of Procedure:  After informed consent was verified, she was taken back to OR, placed in s
upine fashion on the operating table.  After general anesthesia was given, she was placed in a dorsal
 lithotomy position, arms tucked by the side, positioning checked, pump started SCDs, and time-out do
ne.  Pelvic exam was performed.  Uterus found to be enlarged, about 8 weeks' size, well mobile, no no
dularity in the uterosacral ligaments was noted. 



Abdomen, vulva, vagina, and perineum were prepped and draped in a sterile fashion.  Dillon was placed 
to drain the bladder.  Cervix was exposed with a speculum.  Anterior lip grasped with 2 Allis clamps.
  SlimLine diagnostic hysteroscope with a 30-degree lens and normal saline was used for direct hyster
oscopy through the cervical canal and uterine cavity was entered.  The endometrium appeared to be thi
ckened.  No intracavitary masses.  Scope was removed.  Endometrial curettings were performed thorough
ly with the help of #2 and #4 curettes.  Once this was done, a diagnostic VCare was introduced and fi
xed in place.  This area was draped. 



A 1 cm infraumbilical incision was made with a scalpel using the open laparoscopy technique.  Fascia 
was incised, tagged, and peritoneum entered sharply with a knife.  S retractor was placed.  Ramírez in
troduced, site of entry checked, unremarkable upper abdominal surfaces including the gallbladder, teresa
er, peritoneum above the liver were all visualized.  Omentum appeared to be unremarkable as well.  Pa
tient was placed in Trendelenburg position, 5 mm left lower quadrant and suprapubic ports were placed
 to evaluate for endometriosis and findings as above.  The left sigmoid was adhered to the lateral wa
ll.  The ureter was completely not visual.  The left paracolic gutter was with adhesions and the tube
 was adhered to this in this complex.  So, the distal part of the tube was dilated and appeared to be
 inflamed.  The paratubal cyst was visualized, which was unremarkable.  The ovary appeared to be comp
letely unremarkable.  No evidence of any endometriosis was found in the entire pelvis.  So, I took do
wn the adhesions due to the patient's symptoms being on the left as well.  This started at the natura
l attachment of the sigmoid and with the help of sharp dissection, the peritoneal adhesions were open
ed up, not opening the peritoneum.  From the distal aspect, I started to retract the bowel towards me
 and adhesions were taken down from the left lateral wall sharply with push-spread technique and crea
ting windows.  Then, the top dissection was started.  The peritoneum posteriorly was opened up.  Then
, the ureter was identified at the level of the pelvic brim.  As the ureter was kept medial, the rissa
l adhesions were taken down laterally, staying in the plane without getting retroperitoneal.  Once al
l adhesions were taken down, the entire course of the ureter was well visualized and was unremarkable
.  The tubal adhesions had been also removed in the course of the dissection of the sigmoid adhesions
.  The mesosalpinx was incised, opened up with scissors, and windows were created to take down the tu
be.  The distal part of the mesosalpinx was taken with the EnSeal.  The proximal part was then dissec
vane with the EnSeal as well and removed in its entirety.  Specimen was retrieved through the umbilica
l port.  Thorough irrigation and suction were performed.  There were some erythematous spots on the p
eritoneum lateral to the uterosacral ligament, about 3-5 cm proximal to the insertion at the uterus. 
 This was picked up.  The peritoneum was incised with scissors and the entire lesion was excised with
 the help of the same.  There was excellent hemostasis.  Thorough irrigation and suction were perform
ed again.  The trocars were removed under direct vision and no evidence of any bleeding.  Gas was eron
ufflated.  Umbilical port was removed under direct vision and the fascia closed with the help of the 
tag sutures tied on both sides.  Subcutaneous had simple 0 Vicryl placed and then all skin incisions 
closed with the help of interrupted 4-0 Vicryls.  VCare and Dillon were removed.  Instrument, needle, 
and sponge counts were correct at the end of the case.  The patient tolerated the procedure well, and
 she was extubated in the OR and taken to PACU in a stable condition.  She will follow up with me in 
1 week.  She has been given 20 tablets of Norco and instructed that she cannot take her tramadol with
 it.   was present at the time I counseled the patient and is aware.  Ibuprofen every 6 hours 
600 mg orally with some food.  If there are any complaints, then she will call us.





ANTWON

DD:  07/31/2019 09:28:59Voice ID:  [id number]

DT:  07/31/2019 20:29:52Report ID:  [id number]

## 2019-11-22 ENCOUNTER — HOSPITAL ENCOUNTER (EMERGENCY)
Dept: HOSPITAL 97 - ER | Age: 39
Discharge: HOME | End: 2019-11-22
Payer: COMMERCIAL

## 2019-11-22 VITALS — TEMPERATURE: 98.3 F

## 2019-11-22 VITALS — DIASTOLIC BLOOD PRESSURE: 80 MMHG | SYSTOLIC BLOOD PRESSURE: 157 MMHG | OXYGEN SATURATION: 99 %

## 2019-11-22 DIAGNOSIS — I10: ICD-10-CM

## 2019-11-22 DIAGNOSIS — N92.6: Primary | ICD-10-CM

## 2019-11-22 DIAGNOSIS — Z91.048: ICD-10-CM

## 2019-11-22 LAB
BUN BLD-MCNC: 14 MG/DL (ref 7–18)
GLUCOSE SERPLBLD-MCNC: 100 MG/DL (ref 74–106)
HCT VFR BLD CALC: 43.2 % (ref 36–45)
LYMPHOCYTES # SPEC AUTO: 2.6 K/UL (ref 0.7–4.9)
PMV BLD: 9.3 FL (ref 7.6–11.3)
POTASSIUM SERPL-SCNC: 3.7 MMOL/L (ref 3.5–5.1)
RBC # BLD: 4.48 M/UL (ref 3.86–4.86)

## 2019-11-22 PROCEDURE — 81003 URINALYSIS AUTO W/O SCOPE: CPT

## 2019-11-22 PROCEDURE — 36415 COLL VENOUS BLD VENIPUNCTURE: CPT

## 2019-11-22 PROCEDURE — 96374 THER/PROPH/DIAG INJ IV PUSH: CPT

## 2019-11-22 PROCEDURE — 81025 URINE PREGNANCY TEST: CPT

## 2019-11-22 PROCEDURE — 99284 EMERGENCY DEPT VISIT MOD MDM: CPT

## 2019-11-22 PROCEDURE — 80048 BASIC METABOLIC PNL TOTAL CA: CPT

## 2019-11-22 PROCEDURE — 76830 TRANSVAGINAL US NON-OB: CPT

## 2019-11-22 PROCEDURE — 96375 TX/PRO/DX INJ NEW DRUG ADDON: CPT

## 2019-11-22 PROCEDURE — 85025 COMPLETE CBC W/AUTO DIFF WBC: CPT

## 2019-11-22 NOTE — XMS REPORT
Patient Summary Document

 Created on:2019



Patient:LEXI WALKER

Sex:Female

:1980

External Reference #:[id number]





Demographics







 Address  [address] [place] [address]

 

 Home Phone  [phone]

 

 Work Phone  [phone]

 

 Email Address  DECLINED

 

 Preferred Language  Unknown

 

 Marital Status  Unknown

 

 Adventism Affiliation  Unknown

 

 Race  Unknown

 

 Additional Race(s)  Unavailable

 

 Ethnic Group  Unknown









Author







 Organization  [facility]

 

 Address  [address]



   [place] [address]

 

 Phone  [phone]









Care Team Providers







 Name  Role  Phone

 

 Unavailable  Unavailable  Unavailable









Problems

This patient has no known problems.



Allergies, Adverse Reactions, Alerts

This patient has no known allergies or adverse reactions.



Medications

This patient has no known medications.



Results







 Test Description  Test Time  Test Comments  Text Results  Atomic Results  
Result Comments









 Automated Differential  2019 23:01:16    









   Test Item  Value  Reference Range  Comments









 Neutro Auto (test code=Neutro Auto)  71.9 %  36.0-70.0  

 

 Lymph Auto (test code=Lymph Auto)  22.8 %  12.0-44.0  

 

 Mono Auto (test code=Mono Auto)  4.4 %  0.0-11.0  

 

 Eos, Auto (test code=Eos, Auto)  0.2 %  0.0-7.0  

 

 Basophil Auto (test code=Basophil Auto)  0.5 %  0.0-2.0  

 

 Neutro Absolute (test code=Neutro Absolute)  6.4 x10  1.6-7.4  

 

 Lymph Absolute (test code=Lymph Absolute)  2.02 x10  .50-4.60  

 

 Mono Absolute (test code=Mono Absolute)  .39 x10  .00-1.20  

 

 Eos Absolute (test code=Eos Absolute)  0.02 x10  0.00-0.74  

 

 Baso Absolute (test code=Baso Absolute)  0.04 x10  0.00-0.21  



IG [id number] 23:01:16





 Test Item  Value  Reference Range  Comments

 

 IG (test code=IG)  0.2 %  0.0-5.0  

 

 IG Abs (test code=IG Abs)  0 x10    



Complete Blood Count with [id number] 23:01:15





 Test Item  Value  Reference Range  Comments

 

 WBC (test code=WBC)  8.9 x10  4.4-10.5  

 

 RBC (test code=RBC)  4.26 x10  3.75-5.20  

 

 Hgb (test code=Hgb)  13.6 g/dL  12.2-14.8  

 

 MCV (test code=MCV)  96.20 fL  80..00  

 

 Hct (test code=Hct)  41.0 %  36.5-44.4  

 

 MCHC (test code=MCHC)  33.20 g/dL  32.00-37.50  

 

 RDW CV (test code=RDW CV)  12.7 %  11.5-14.5  

 

 MCH (test code=MCH)  31.9 pg  27.0-32.5  

 

 Platelets (test  265.0 x10  140.0-440.0  



 code=Platelets)      

 

 MPV (test code=MPV)  11.3 fL    

 

 Slide Review (test code=Slide  Auto  Auto  Result created by



 Review)      GL_SJM_[email or web address]_AUTO

 

 nRBC (test code=nRBC)  0    

 

 NRBC Abs (test code=NRBC Abs)  0.00 x10    

 

 IPF (test code=IPF)  0 %    



Lipid [id number] 22:51:51





 Test Item  Value  Reference Range  Comments

 

 Cholesterol Total (test  152 mg/dL  0-200  RISK OF HEART DISEASEPublished



 code=Cholesterol Total)      by American Heart Association



       Analyte  Optimal Borderline



       Increased RiskCHOL  <200



       200-239 >240TRIG  <150 150-199



       >200HDL Male  >60  <40HDL



       Female  >60  <50LDL  <100



       130-159 >160LDL  Near optimal



       is 100-129

 

 Triglycerides (test  64 mg/dL  9-200  



 code=Triglycerides)      

 

 HDL (test code=HDL)  52 mg/dL  50-60  

 

 LDL (test code=LDL)  87 mg/dL  0-130  The equation being used in this



       calculation is  LDL=(Chol -



       HDL) - (Trig / 5)

 

 VLDL (test code=VLDL)  13 mg/dL  5-40  The equation being used in this



       calculation is  VLDL=Trig / 5

 

 Chol/HDL (test  2.9 ratio  0.0-4.4  



 code=Chol/HDL)      

 

 LDL/HDL Ratio (test  2    The equation being used in this



 code=LDL/HDL Ratio)      calculation is  LDL/HDL



       Ratio=LDL Calc/HDL Chol



Comprehensive Metabolic [id number] 22:51:50





 Test Item  Value  Reference Range  Comments

 

 Sodium Level (test code=Sodium Level)  137.0 mmol/L  135.0-145.0  

 

 Potassium Level (test code=Potassium Level)  4.6 mmol/L  3.5-5.1  

 

 Chloride Level (test code=Chloride Level)  103 mmol/L    

 

 CO2 (test code=CO2)  27 mmol/L  22-29  

 

 Anion Gap (test code=Anion Gap)  7 mmol/L  7-16  

 

 BUN (test code=BUN)  15.50 mg/dL  6.00-20.00  

 

 Creatinine Level (test code=Creatinine Level)  0.50 mg/dL  0.50-0.90  

 

 BUN/Creat Ratio (test code=BUN/Creat Ratio)  31    

 

 Glucose Level (test code=Glucose Level)  95 mg/dL    

 

 Calcium Level (test code=Calcium Level)  9.3 mg/dL  8.3-10.5  

 

 Alk Phos (test code=Alk Phos)  52 U/L    

 

 Bilirubin Total (test code=Bilirubin Total)  <0.1 mg/dL  0.1-0.9  

 

 Albumin Level (test code=Albumin Level)  4.2 g/dL  3.5-5.2  

 

 Protein Total (test code=Protein Total)  6.6 g/dL  6.4-8.3  

 

 ALT (test code=ALT)  13 U/L  1-33  

 

 AST (test code=AST)  16 U/L  1-32  

 

 Globulin (test code=Globulin)  2.4 g/dL  2.9-3.1  

 

 A/G Ratio (test code=A/G Ratio)  1.8 ratio    



Comprehensive Metabolic [id number] 22:51:50





 Test Item  Value  Reference Range  Comments

 

 Sodium Level (test  137.0 mmol/L  135.0-145.0  



 code=Sodium Level)      

 

 Potassium Level (test  4.6 mmol/L  3.5-5.1  



 code=Potassium Level)      

 

 Chloride Level (test  103 mmol/L    



 code=Chloride Level)      

 

 CO2 (test code=CO2)  27 mmol/L  22-29  

 

 Anion Gap (test  7 mmol/L  7-16  



 code=Anion Gap)      

 

 BUN (test code=BUN)  15.50 mg/dL  6.00-20.00  

 

 Creatinine Level (test  0.50 mg/dL  0.50-0.90  



 code=Creatinine Level)      

 

 BUN/Creat Ratio (test  31    



 code=BUN/Creat Ratio)      

 

 Glucose Level (test  95 mg/dL    



 code=Glucose Level)      

 

 Calcium Level (test  9.3 mg/dL  8.3-10.5  



 code=Calcium Level)      

 

 Alk Phos (test code=Alk  52 U/L    



 Phos)      

 

 Bilirubin Total (test  <0.1 mg/dL  0.1-0.9  



 code=Bilirubin Total)      

 

 Albumin Level (test  4.2 g/dL  3.5-5.2  



 code=Albumin Level)      

 

 Protein Total (test  6.6 g/dL  6.4-8.3  



 code=Protein Total)      

 

 ALT (test code=ALT)  13 U/L  1-33  

 

 AST (test code=AST)  16 U/L  1-32  

 

 Globulin (test  2.4 g/dL  2.9-3.1  



 code=Globulin)      

 

 A/G Ratio (test code=A/G  1.8 ratio    



 Ratio)      

 

 eGFR AA (test code=eGFR  >60 mL/min/1.73 m2    eGFR (estimated



 AA)      Glomerular Filtration



       Rate) is an estimated



       value, calculated from



       the patient's serum



       creatinine using the MDRD



       equation. It is NOT the



       patient's actual GFR. The



       eGFR provides a more



       clinically useful measure



       of kidney disease than



       serum creatinine



       alone.***This calculation



       takes sex and race into



       account, if the



       information is provided.



       If the race is not



       provided, and the patient



       is -American,



       multiply by 1.212. If sex



       is not provided, and the



       patient is female,



       multiply by 0.742.



       Results for patients <18



       years of age have not



       been validated by the



       MDRD study and should be



       interpreted with caution.



       eGFR Result



       Interpretation:eGFR >



       or=60 is in the Normal



       RangeeGFR &lt; 60 may



       mean kidney diseaseeGFR <



       15 may mean kidney



       failure*** Ranges



       recommended by the



       National Kidney



       Foundation,



       http://nkdep.nih.gov



Comprehensive Metabolic [id number] 22:51:50





 Test Item  Value  Reference Range  Comments

 

 Sodium Level (test  137.0 mmol/L  135.0-145.0  



 code=Sodium Level)      

 

 Potassium Level (test  4.6 mmol/L  3.5-5.1  



 code=Potassium Level)      

 

 Chloride Level (test  103 mmol/L    



 code=Chloride Level)      

 

 CO2 (test code=CO2)  27 mmol/L  22-29  

 

 Anion Gap (test  7 mmol/L  7-16  



 code=Anion Gap)      

 

 BUN (test code=BUN)  15.50 mg/dL  6.00-20.00  

 

 Creatinine Level (test  0.50 mg/dL  0.50-0.90  



 code=Creatinine Level)      

 

 BUN/Creat Ratio (test  31    



 code=BUN/Creat Ratio)      

 

 Glucose Level (test  95 mg/dL    



 code=Glucose Level)      

 

 Calcium Level (test  9.3 mg/dL  8.3-10.5  



 code=Calcium Level)      

 

 Alk Phos (test code=Alk  52 U/L    



 Phos)      

 

 Bilirubin Total (test  <0.1 mg/dL  0.1-0.9  



 code=Bilirubin Total)      

 

 Albumin Level (test  4.2 g/dL  3.5-5.2  



 code=Albumin Level)      

 

 Protein Total (test  6.6 g/dL  6.4-8.3  



 code=Protein Total)      

 

 ALT (test code=ALT)  13 U/L  1-33  

 

 AST (test code=AST)  16 U/L  1-32  

 

 Globulin (test  2.4 g/dL  2.9-3.1  



 code=Globulin)      

 

 A/G Ratio (test code=A/G  1.8 ratio    



 Ratio)      

 

 eGFR AA (test code=eGFR  >60 mL/min/1.73 m2    eGFR (estimated



 AA)      Glomerular Filtration



       Rate) is an estimated



       value, calculated from



       the patient's serum



       creatinine using the MDRD



       equation. It is NOT the



       patient's actual GFR. The



       eGFR provides a more



       clinically useful measure



       of kidney disease than



       serum creatinine



       alone.***This calculation



       takes sex and race into



       account, if the



       information is provided.



       If the race is not



       provided, and the patient



       is -American,



       multiply by 1.212. If sex



       is not provided, and the



       patient is female,



       multiply by 0.742.



       Results for patients <18



       years of age have not



       been validated by the



       MDRD study and should be



       interpreted with caution.



       eGFR Result



       Interpretation:eGFR >



       or=60 is in the Normal



       RangeeGFR &lt; 60 may



       mean kidney diseaseeGFR <



       15 may mean kidney



       failure*** Ranges



       recommended by the



       National Kidney



       Foundation,



       http://nkdep.nih.gov

 

 eGFR Non-AA (test  >60.00 mL/min/1.73    eGFR (estimated



 code=eGFR Non-AA)  m2    Glomerular Filtration



       Rate) is an estimated



       value, calculated from



       the patient's serum



       creatinine using the MDRD



       equation. It is NOT the



       patient's actual GFR. The



       eGFR provides a more



       clinically useful measure



       of kidney disease than



       serum creatinine



       alone.***This calculation



       takes sex and race into



       account, if the



       information is provided.



       If the race is not



       provided, and the patient



       is -American,



       multiply by 1.212. If sex



       is not provided, and the



       patient is female,



       multiply by 0.742.



       Results for patients <18



       years of age have not



       been validated by the



       MDRD study and should be



       interpreted with caution.



       eGFR Result



       Interpretation:eGFR >



       or=60 is in the Normal



       RangeeGFR &lt; 60 may



       mean kidney diseaseeGFR <



       15 may mean kidney



       failure*** Ranges



       recommended by the



       National Kidney



       Foundation,



       http://nkdep.nih.gov



XR Chest 1 View [id number] 11:17:46Patient:   LEXI WALKER          
                       MRN:    [id number] Date/Time201810:56 CDTReason 
for ExamShortness of breathReportEXAM: XR Chest 1 View FrontalLOCATION: 
[id number]: Shortness of breathCOMPARISON: None available time of 
interpretation.FINDINGS:Single view of the chest.No indwelling lines or 
tubes.No pneumothorax.The lungs are clear.   No pleural effusions are present. 
  The mediastinal contours are unremarkable.   No acute osseous findings are 
present.IMPRESSION:Noacute cardiopulmonary abnormality.***** Final *****
Dictated by:    MD Keene Haider ADictated DT/TM: 2018 11:17 amSigned by
:  MD Keene Haider ASignbijal (Electronic Signature):  2018 11:17 am

## 2019-11-22 NOTE — ER
Nurse's Notes                                                                                     

 [facility]                                                                 

Name: Dorothy Marcelino                                                                             

Age: 39 yrs                                                                                       

Sex: Female                                                                                       

: 1980                                                                                   

MRN: [id number]                                                                                   

Arrival Date: 2019                                                                          

Time: 15:34                                                                                       

Account#: [id number]                                                                            

Bed 25                                                                                            

Private MD:                                                                                       

Diagnosis: Irregular menstruation, unspecified;Low back pain                                      

                                                                                                  

Presentation:                                                                                     

                                                                                             

15:34 Presenting complaint: Patient states: was on menstrual cycle for 2 weeks with clots and sv  

      stopped . Yesterday started having vaginal brown spotting only when she wipes.        

      Today started having red vaginal bleeding and back pain, reports yesterday she was          

      having right upper thigh pain and left groin pain. c/o anxiety and lightheadedness.         

      Transition of care: patient was not received from another setting of care. Onset of         

      symptoms was 2019. Care prior to arrival: None.                                

15:34 Method Of Arrival: Ambulatory                                                           sv  

15:34 Acuity: EVI 2                                                                           sv  

15:40 Risk Assessment: Do you want to hurt yourself or someone else? Patient reports no       tr5 

      desire to harm self or others. Initial Sepsis Screen: Does the patient meet any 2           

      criteria? Temp <36.0*C (96.8*F)) or > 38.3*C (100.9*F). HR > 90 bpm. Does the patient       

      have a suspected source of infection? No. Patient's initial sepsis screen is negative.      

                                                                                                  

Historical:                                                                                       

- Allergies:                                                                                      

15:37 adhesive tape;                                                                          sv  

- PMHx:                                                                                           

15:37 Anxiety; MVP;                                                                           sv  

15:39 Mitral valve prolapse; Hypertension;                                                    sv  

                                                                                                  

- Immunization history:: Adult Immunizations up to date.                                          

- Social history:: Smoking status: Patient/guardian denies using tobacco, never smoked.           

- Ebola Screening: : No symptoms or risks identified at this time.                                

                                                                                                  

                                                                                                  

Screenin:00 Abuse screen: Denies threats or abuse. Nutritional screening: No deficits noted.        tr5 

      Tuberculosis screening: No symptoms or risk factors identified. Fall Risk None              

      identified.                                                                                 

                                                                                                  

Assessment:                                                                                       

16:00 General: Appears uncomfortable, Behavior is calm, cooperative, appropriate for age.     tr5 

      Pain: Complains of pain in right low back Pain currently is 8 out of 10 on a pain           

      scale. Quality of pain is described as aching. Neuro: Level of Consciousness is awake,      

      alert, obeys commands, Oriented to person, place, time,  are equal bilaterally         

      Moves all extremities. Cardiovascular: Heart tones present Capillary refill < 3 seconds     

      Pulses are all present. Edema is absent. Respiratory: Airway is patent Respiratory          

      effort is even, unlabored, Respiratory pattern is regular, symmetrical. GI: No signs        

      and/or symptoms were reported involving the gastrointestinal system. : Urine is           

      clear, Reports vaginal bleeding that is bright red. EENT: No signs and/or symptoms were     

      reported regarding the EENT system. Derm: No signs and/or symptoms reported regarding       

      the dermatologic system. Musculoskeletal: No signs and/or symptoms reported regarding       

      the musculoskeletal system.                                                                 

17:00 Reassessment: Patient appears in no apparent distress at this time. No changes from     tr5 

      previously documented assessment. Patient and/or family updated on plan of care and         

      expected duration. Pain level reassessed. Patient is alert, oriented x 3, equal             

      unlabored respirations, skin warm/dry/pink.                                                 

18:00 Reassessment: Patient appears in no apparent distress at this time. Patient and/or      tr5 

      family updated on plan of care and expected duration. Pain level reassessed. Patient is     

      alert, oriented x 3, equal unlabored respirations, skin warm/dry/pink.                      

                                                                                                  

Vital Signs:                                                                                      

15:37  / 113; Pulse 92; Resp 22; Temp 98.3(O); Pulse Ox 100% ; Weight 54.43 kg; Height  sv  

      5 ft. 1 in. (154.94 cm); Pain 8/10;                                                         

17:35  / 99; Pulse 83; Resp 16; Pulse Ox 100% on R/A;                                   tr5 

18:36  / 80; Pulse 101; Resp 17; Pulse Ox 99% on R/A;                                   tr5 

15:37 Body Mass Index 22.67 (54.43 kg, 154.94 cm)                                             sv  

                                                                                                  

ED Course:                                                                                        

15:34 Patient arrived in ED.                                                                  sv  

15:35 [facility]                                                        kb  

15:35 Clyde Castillo MD is Attending Physician.                                              kb  

15:36 Triage completed.                                                                       sv  

15:37 Arm band placed on.                                                                     sv  

15:48 Vineet Liu, RN is Primary Nurse.                                                 tr5 

16:00 Bed in low position. Call light in reach. Side rails up X 1.                            tr5 

16:00 Initial lab(s) drawn, by me, sent to lab. Inserted saline lock: 22 gauge in left        tr5 

      antecubital area, using aseptic technique.                                                  

16:40 Urine collected: clean catch specimen, clear, gamal colored.                            jp3 

17:16 US Transvaginal Study (Probe) In Process Unspecified.                                   EDMS

18:09 No provider procedures requiring assistance completed. IV discontinued.                 tr5 

                                                                                                  

Administered Medications:                                                                         

17:45 Drug: TORadol - Ketorolac 15 mg Route: IVP; Site: left antecubital;                     tr5 

18:14 Follow up: Response: Pain is unchanged, physician notified                              tr5 

18:33 Drug: morphine 4 mg {Note: RASS:0.} Route: IVP; Site: left antecubital;                 tr5 

18:33 Drug: Zofran 4 mg Route: IVP; Site: left antecubital;                                   tr5 

                                                                                                  

                                                                                                  

Outcome:                                                                                          

18:09 Discharged to home via wheelchair, with family.                                         tr5 

18:09 Condition: stable                                                                           

18:09 Discharge instructions given to patient, family, Instructed on discharge instructions,      

      follow up and referral plans. medication usage, Demonstrated understanding of               

      instructions, follow-up care, medications, Prescriptions given X 1.                         

18:45 Discharge ordered by MD.                                                                kb  

19:22 Patient left the ED.                                                                    tr5 

                                                                                                  

Signatures:                                                                                       

Dispatcher [employer]                           EDMS                                                 

Arlene Alvarenga, WIFLREDO KRISHNAN-Fiona Foreman, RN                    RN                                                      

Kevin Wilcox                              jp3                                                  

Vineet Liu, RN                   RN   tr5                                                  

                                                                                                  

Corrections: (The following items were deleted from the chart)                                    

15:37 15:34 Presenting complaint: Patient states: was on menstrual cycle for 2 weeks with     sv  

      clots and stopped . Yesterday started having vaginal brown spotting only when she     

      wipes. Today started having red vaginal bleeding. sv                                        

15:37 15:34 Presenting complaint: Patient states: was on menstrual cycle for 2 weeks with     sv  

      clots and stopped . Yesterday started having vaginal brown spotting only when she     

      wipes. Today started having red vaginal bleeding and back pain, reports yesterday she       

      was having right upper thigh pain. sv                                                       

15:39 15:37 Temp 98.3F Oral; 54.43 kg; Height 5 ft. 1 in.; BMI: 22.6; sv                      sv  

15:40 15:34 Presenting complaint: Patient states: was on menstrual cycle for 2 weeks with     sv  

      clots and stopped . Yesterday started having vaginal brown spotting only when she     

      wipes. Today started having red vaginal bleeding and back pain, reports yesterday she       

      was having right upper thigh pain and left groin pain. sv                                   

15:40 15:34 Acuity: EVI 3 sv                                                                  sv  

18:34 18:33 morphine 4 mg IVP in left antecubital tr5                                         tr5 

                                                                                                  

**************************************************************************************************

## 2019-11-22 NOTE — EDPHYS
Physician Documentation                                                                           

 [facility]                                                                 

Name: Dorothy Marcelino                                                                             

Age: 39 yrs                                                                                       

Sex: Female                                                                                       

: 1980                                                                                   

MRN: [id number]                                                                                   

Arrival Date: 2019                                                                          

Time: 15:34                                                                                       

Account#: [id number]                                                                            

Bed 25                                                                                            

Private MD:                                                                                       

ED Physician Clyde Castillo                                                                       

HPI:                                                                                              

                                                                                             

17:35 This 39 yrs old  Female presents to ER via Ambulatory with complaints of       kb  

      Vaginal Bleeding, Back Pain.                                                                

17:35 The patient presents with vaginal bleeding that is. Onset: The symptoms/episode         kb  

      began/occurred 2 week(s) ago. Modifying factors: The symptoms are alleviated by             

      nothing, the symptoms are aggravated by nothing. Associated signs and symptoms:             

      Pertinent positives: vaginal bleeding. Severity of symptoms: At their worst the             

      symptoms were moderate, in the emergency department the symptoms have improved. The         

      patient has not experienced similar symptoms in the past. The patient has not recently      

      seen a physician. Pt reports her last period lasted 2 weeks, stopped on . Started     

      having spotting again yesterday that was a brown color and today it is bright red           

      again. States she has never had a period that lasted that long. Is concerned about          

      uterine cancer because it runs in the family and her cousin was just diagnosed. States      

      that her anxiety has been threw the roof lately. Pt appears anxious during history,         

      tearful and states "I'm getting emotional, but I'm trying not to.".                         

                                                                                                  

Historical:                                                                                       

- Allergies:                                                                                      

15:37 adhesive tape;                                                                          sv  

- PMHx:                                                                                           

15:37 Anxiety; MVP;                                                                           sv  

15:39 Mitral valve prolapse; Hypertension;                                                    sv  

                                                                                                  

- Immunization history:: Adult Immunizations up to date.                                          

- Social history:: Smoking status: Patient/guardian denies using tobacco, never smoked.           

- Ebola Screening: : No symptoms or risks identified at this time.                                

                                                                                                  

                                                                                                  

ROS:                                                                                              

17:33 Constitutional: Negative for fever, chills, and weight loss, Neck: Negative for injury, kb  

      pain, and swelling, Cardiovascular: Negative for chest pain, palpitations, and edema,       

      Respiratory: Negative for shortness of breath, cough, wheezing, and pleuritic chest         

      pain, Abdomen/GI: Negative for nausea, vomiting, diarrhea, and constipation. + left         

      pelvic pain MS/Extremity: Negative for injury and deformity, Skin: Negative for injury,     

      rash, and discoloration, Neuro: Negative for headache, weakness, numbness, tingling,        

      and seizure.                                                                                

17:33 Back: Positive for pain at rest, pain with movement, of the low back area.                  

17:33 : Positive for vaginal bleeding.                                                          

                                                                                                  

Exam:                                                                                             

17:33 Constitutional:  This is a well developed, well nourished patient who is awake, alert,  kb  

      and in no acute distress. Head/Face:  Normocephalic, atraumatic. Neck:  Trachea             

      midline, no thyromegaly or masses palpated, and no cervical lymphadenopathy.  Supple,       

      full range of motion without nuchal rigidity, or vertebral point tenderness.  No            

      Meningismus. Chest/axilla:  Normal chest wall appearance and motion.  Nontender with no     

      deformity.  No lesions are appreciated. Cardiovascular:  Regular rate and rhythm with a     

      normal S1 and S2.  No gallops, murmurs, or rubs.  Normal PMI, no JVD.  No pulse             

      deficits. Respiratory:  Lungs have equal breath sounds bilaterally, clear to                

      auscultation and percussion.  No rales, rhonchi or wheezes noted.  No increased work of     

      breathing, no retractions or nasal flaring. Abdomen/GI:  Soft, non-tender, with normal      

      bowel sounds.  No distension or tympany.  No guarding or rebound.  No evidence of           

      tenderness throughout. Back:  No spinal tenderness.  No costovertebral tenderness.          

      Full range of motion. Skin:  Warm, dry with normal turgor.  Normal color with no            

      rashes, no lesions, and no evidence of cellulitis. MS/ Extremity:  Pulses equal, no         

      cyanosis.  Neurovascular intact.  Full, normal range of motion. Neuro:  Awake and           

      alert, GCS 15, oriented to person, place, time, and situation.  Cranial nerves II-XII       

      grossly intact.  Motor strength 5/5 in all extremities.  Sensory grossly intact.            

      Cerebellar exam normal.  Normal gait.                                                       

                                                                                                  

Vital Signs:                                                                                      

15:37  / 113; Pulse 92; Resp 22; Temp 98.3(O); Pulse Ox 100% ; Weight 54.43 kg; Height  sv  

      5 ft. 1 in. (154.94 cm); Pain 8/10;                                                         

17:35  / 99; Pulse 83; Resp 16; Pulse Ox 100% on R/A;                                   tr5 

18:36  / 80; Pulse 101; Resp 17; Pulse Ox 99% on R/A;                                   tr5 

15:37 Body Mass Index 22.67 (54.43 kg, 154.94 cm)                                             sv  

                                                                                                  

MDM:                                                                                              

15:40 Patient medically screened.                                                             kb  

15:53 Data reviewed: vital signs, nurses notes. Data interpreted: Pulse oximetry: on room air kb  

      is 100 %. Interpretation: normal.                                                           

18:44 Counseling: I had a detailed discussion with the patient and/or guardian regarding: the kb  

      historical points, exam findings, and any diagnostic results supporting the                 

      discharge/admit diagnosis, lab results, radiology results, the need for outpatient          

      follow up, an OB/Gyne specialist, to return to the emergency department if symptoms         

      worsen or persist or if there are any questions or concerns that arise at home.             

19:20 ED course: US tech impression 4cm and 2cm lesions on uterus. Ovaries unremarkable. Pt   kb  

      educated to follow up with GYN for further evaluation.                                      

                                                                                                  

                                                                                             

15:54 Order name: CBC with Diff; Complete Time: 16:30                                         kb  

                                                                                             

15:54 Order name: Basic Metabolic Panel; Complete Time: 16:43                                 kb  

                                                                                             

15:54 Order name: US Transvaginal Study (Probe)                                               kb  

                                                                                             

16:49 Order name: Urine Dipstick--Ancillary (enter results); Complete Time: 17:32             ms  

                                                                                             

16:49 Order name: Urine Pregnancy--Ancillary (enter results); Complete Time: 17:32            ms  

                                                                                             

15:38 Order name: Urine Dipstick-Ancillary (obtain specimen); Complete Time: 16:49            kb  

                                                                                             

15:38 Order name: Urine Pregnancy Test (obtain specimen); Complete Time: 16:49                kb  

                                                                                             

15:54 Order name: IV Start; Complete Time: 16:14                                              kb  

                                                                                             

17:05 Order name: Vital Signs; Complete Time: 17:34                                           kb  

                                                                                                  

Administered Medications:                                                                         

17:45 Drug: TORadol - Ketorolac 15 mg Route: IVP; Site: left antecubital;                     tr5 

18:14 Follow up: Response: Pain is unchanged, physician notified                              tr5 

18:33 Drug: morphine 4 mg {Note: RASS:0.} Route: IVP; Site: left antecubital;                 tr5 

18:33 Drug: Zofran 4 mg Route: IVP; Site: left antecubital;                                   tr5 

                                                                                                  

                                                                                                  

Disposition:                                                                                      

                                                                                             

07:23 Co-signature as Attending Physician, Clyde Castillo MD I agree with the assessment and   kdr 

      plan of care.                                                                               

                                                                                                  

Disposition:                                                                                      

19 18:45 Discharged to Home. Impression: Irregular menstruation, unspecified, Low back      

  pain.                                                                                           

- Condition is Stable.                                                                            

- Discharge Instructions: Pelvic Mass, Uterine Fibroids, Easy-to-Read, Abnormal Uterine           

  Bleeding, Easy-to-Read.                                                                         

                                                                                                  

- Medication Reconciliation Form, Thank You Letter, Antibiotic Education, Prescription            

  Opioid Use form.                                                                                

- Work release form (19 19:35).                                                         mg2 

- Follow up: Emergency Department; When: As needed; Reason: Worsening of condition.               

  Follow up: Private Physician; When: 2 - 3 days; Reason: Recheck today's complaints,             

  Continuance of care, Re-evaluation by your physician.                                           

                                                                                                  

                                                                                                  

                                                                                                  

Signatures:                                                                                       

Dispatcher [employer]                           EDArlene Ray, Fiona Hernadez, RN                    RN   sv                                                   

Clyde Castillo MD MD   kdr                                                  

Vineet Liu RN RN   tr5                                                  

Christopher Coles RN   mg2                                                  

                                                                                                  

Corrections: (The following items were deleted from the chart)                                    

                                                                                             

19:22 18:45 2019 18:45 Discharged to Home. Impression: Irregular menstruation,          tr5 

      unspecified; Low back pain. Condition is Stable. Forms are Medication Reconciliation        

      Form, Thank You Letter, Antibiotic Education, Prescription Opioid Use. Follow up:           

      Emergency Department; When: As needed; Reason: Worsening of condition. Follow up:           

      Private Physician; When: 2 - 3 days; Reason: Recheck today's complaints, Continuance of     

      care, Re-evaluation by your physician. kb                                                   

                                                                                                  

**************************************************************************************************

## 2019-11-22 NOTE — RAD REPORT
EXAM DESCRIPTION:  US - Transvaginal Study Probe - 11/22/2019 5:34 pm

 

CLINICAL HISTORY:  Abdominal pain, pelvic and back pain, vaginal spotting

 

COMPARISON:  None.

 

TECHNIQUE:  Endovaginal sonography was performed.

 

FINDINGS:  The 2.5 centimeter anechoic right ovarian cyst is present. No abnormality of the ovarian s
troma. Left ovary not clearly visualized. Bowel gas is limiting. No left adnexal abnormality seen. No
 blood or fluid in the cul de sac.

 

Uterus is normal in size at 9.8 cm maximum dimension. No discrete endometrial mass or polyp. In the p
osterior left fundus a 4.4 centimeter heterogeneous mass is present. A separate 2 centimeter hypoecho
ic posterior uterine mass is seen. Both are believed to be fibroids.

 

IMPRESSION:  Uterine fibroids are present without discrete endometrial abnormality.

 

A 2.5 centimeter right ovarian cyst is present. Left ovary is not clearly visualized due to bowel. No
 left adnexal mass seen.

## 2020-11-28 ENCOUNTER — HOSPITAL ENCOUNTER (EMERGENCY)
Dept: HOSPITAL 97 - ER | Age: 40
Discharge: HOME | End: 2020-11-28
Payer: COMMERCIAL

## 2020-11-28 VITALS — TEMPERATURE: 98.3 F | OXYGEN SATURATION: 100 %

## 2020-11-28 VITALS — DIASTOLIC BLOOD PRESSURE: 87 MMHG | SYSTOLIC BLOOD PRESSURE: 131 MMHG

## 2020-11-28 DIAGNOSIS — I10: ICD-10-CM

## 2020-11-28 DIAGNOSIS — Z20.828: ICD-10-CM

## 2020-11-28 DIAGNOSIS — Z91.048: ICD-10-CM

## 2020-11-28 DIAGNOSIS — N39.0: Primary | ICD-10-CM

## 2020-11-28 LAB
ALBUMIN SERPL BCP-MCNC: 4.2 G/DL (ref 3.4–5)
ALP SERPL-CCNC: 71 U/L (ref 45–117)
ALT SERPL W P-5'-P-CCNC: 26 U/L (ref 12–78)
AST SERPL W P-5'-P-CCNC: 18 U/L (ref 15–37)
BUN BLD-MCNC: 19 MG/DL (ref 7–18)
GLUCOSE SERPLBLD-MCNC: 88 MG/DL (ref 74–106)
HCT VFR BLD CALC: 44.4 % (ref 36–45)
LIPASE SERPL-CCNC: 126 U/L (ref 73–393)
LYMPHOCYTES # SPEC AUTO: 2.9 K/UL (ref 0.7–4.9)
PMV BLD: 9.8 FL (ref 7.6–11.3)
POTASSIUM SERPL-SCNC: 3.7 MMOL/L (ref 3.5–5.1)
RBC # BLD: 4.78 M/UL (ref 3.86–4.86)

## 2020-11-28 PROCEDURE — 85025 COMPLETE CBC W/AUTO DIFF WBC: CPT

## 2020-11-28 PROCEDURE — 87088 URINE BACTERIA CULTURE: CPT

## 2020-11-28 PROCEDURE — 81025 URINE PREGNANCY TEST: CPT

## 2020-11-28 PROCEDURE — 96361 HYDRATE IV INFUSION ADD-ON: CPT

## 2020-11-28 PROCEDURE — 80048 BASIC METABOLIC PNL TOTAL CA: CPT

## 2020-11-28 PROCEDURE — 96375 TX/PRO/DX INJ NEW DRUG ADDON: CPT

## 2020-11-28 PROCEDURE — 80076 HEPATIC FUNCTION PANEL: CPT

## 2020-11-28 PROCEDURE — 81015 MICROSCOPIC EXAM OF URINE: CPT

## 2020-11-28 PROCEDURE — 99284 EMERGENCY DEPT VISIT MOD MDM: CPT

## 2020-11-28 PROCEDURE — 76377 3D RENDER W/INTRP POSTPROCES: CPT

## 2020-11-28 PROCEDURE — 81003 URINALYSIS AUTO W/O SCOPE: CPT

## 2020-11-28 PROCEDURE — 82947 ASSAY GLUCOSE BLOOD QUANT: CPT

## 2020-11-28 PROCEDURE — 87086 URINE CULTURE/COLONY COUNT: CPT

## 2020-11-28 PROCEDURE — 36415 COLL VENOUS BLD VENIPUNCTURE: CPT

## 2020-11-28 PROCEDURE — 83690 ASSAY OF LIPASE: CPT

## 2020-11-28 PROCEDURE — 74176 CT ABD & PELVIS W/O CONTRAST: CPT

## 2020-11-28 PROCEDURE — 96374 THER/PROPH/DIAG INJ IV PUSH: CPT

## 2020-11-28 NOTE — EDPHYS
Physician Documentation                                                                           

 [facility]                                                                 

Name: Dorothy Marcelino                                                                             

Age: 40 yrs                                                                                       

Sex: Female                                                                                       

: 1980                                                                                   

MRN: [id number]                                                                                   

Arrival Date: 2020                                                                          

Time: 16:30                                                                                       

Account#: [id number]                                                                            

Bed 8                                                                                             

Private MD:                                                                                       

ED Physician Wilner Tamez                                                                         

HPI:                                                                                              

                                                                                             

17:47 This 40 yrs old  Female presents to ER via Ambulatory with complaints of       snw 

      Abdominal Swelling, Dizziness, Back Pain.                                                   

17:47 The patient presents with abdominal pain in the right upper quadrant, right flank.      snw 

      Onset: The symptoms/episode began/occurred gradually, 4 day(s) ago, and became              

      persistent. The symptoms do not radiate. Associated signs and symptoms: Pertinent           

      positives: frequency, back pain. The symptoms are described as crampy. Severity of          

      pain: At its worst the pain was moderate. The patient has not experienced similar           

      symptoms in the past. The patient has not recently seen a physician. pt also states she     

      feels fatigued, occasionally dizzy.                                                         

                                                                                                  

OB/GYN:                                                                                           

18:43 LMP 2020                                                                             jl7 

                                                                                                  

Historical:                                                                                       

- Allergies:                                                                                      

20:35 adhesive tape;                                                                          ll2 

- PMHx:                                                                                           

20:35 Anxiety; Hypertension; mitral valve prolapse; MVP;                                      ll2 

                                                                                                  

- Immunization history:: Adult Immunizations up to date.                                          

- Social history:: Smoking status: Patient denies any tobacco usage or history of.                

                                                                                                  

                                                                                                  

ROS:                                                                                              

16:55 Constitutional: Negative for fever, chills, and weight loss, + extreme fatigue Eyes:    snw 

      Negative for injury, pain, redness, and discharge, ENT: Negative for injury, pain, and      

      discharge, Neck: Negative for injury, pain, and swelling, Cardiovascular: Negative for      

      chest pain, palpitations, and edema, Respiratory: Negative for shortness of breath,         

      cough, wheezing, and pleuritic chest pain.                                                  

16:55 : Negative for injury, bleeding, discharge, and swelling, MS/Extremity: Negative for      

      injury and deformity, Skin: Negative for injury, rash, and discoloration, Neuro:            

      Negative for headache, weakness, numbness, tingling, and seizure, Psych: Negative for       

      depression, anxiety, suicide ideation, homicidal ideation, and hallucinations.              

16:55 Abdomen/GI: Positive for abdominal pain, nausea, bloating.                                  

16:55 Back: Positive for chronic right sided back pain.                                           

                                                                                                  

Exam:                                                                                             

16:54 Constitutional:  This is a well developed, well nourished patient who is awake, alert,  snw 

      and in no acute distress. Head/Face:  Normocephalic, atraumatic. Eyes:  Pupils equal        

      round and reactive to light, extra-ocular motions intact.  Lids and lashes normal.          

      Conjunctiva and sclera are non-icteric and not injected.  Cornea within normal limits.      

      Periorbital areas with no swelling, redness, or edema. ENT:  Nares patent. No nasal         

      discharge, no septal abnormalities noted.  Tympanic membranes are normal and external       

      auditory canals are clear.  Oropharynx with no redness, swelling, or masses, exudates,      

      or evidence of obstruction, uvula midline.  Mucous membranes moist. Neck:  Trachea          

      midline, no thyromegaly or masses palpated, and no cervical lymphadenopathy.  Supple,       

      full range of motion without nuchal rigidity, or vertebral point tenderness.  No            

      Meningismus. Chest/axilla:  Normal chest wall appearance and motion.  Nontender with no     

      deformity.  No lesions are appreciated. Cardiovascular:  Regular rate and rhythm with a     

      normal S1 and S2.  No gallops, murmurs, or rubs.  Normal PMI, no JVD.  No pulse             

      deficits. Respiratory:  Lungs have equal breath sounds bilaterally, clear to                

      auscultation and percussion.  No rales, rhonchi or wheezes noted.  No increased work of     

      breathing, no retractions or nasal flaring. Abdomen/GI:  Soft, generalized tenderness,      

      with normal bowel sounds.  Mild distension, no tympany.  No guarding or rebound.  No        

      evidence of tenderness throughout. Back:  No spinal tenderness.  No costovertebral          

      tenderness.  Full range of motion. Skin:  Warm, dry with normal turgor.  Normal color       

      with no rashes, no lesions, and no evidence of cellulitis. MS/ Extremity:  Pulses           

      equal, no cyanosis.  Neurovascular intact.  Full, normal range of motion. Neuro:  Awake     

      and alert, GCS 15, oriented to person, place, time, and situation.  Cranial nerves          

      II-XII grossly intact.  Motor strength 5/5 in all extremities.  Sensory grossly intact.     

       Cerebellar exam normal.  Normal gait. Psych:  Awake, alert, with orientation to            

      person, place and time.  Behavior, mood, and affect are within normal limits.               

                                                                                                  

Vital Signs:                                                                                      

16:40  / 101; Pulse 89; Resp 16; Temp 98.3; Pulse Ox 100% on R/A; Pain 6/10;            hb  

18:00  / 87; Pulse 77; Resp 17; Pulse Ox 100% on R/A;                                   tw2 

                                                                                                  

MDM:                                                                                              

16:54 Patient medically screened.                                                             snw 

19:30 Data reviewed: vital signs, nurses notes, lab test result(s), radiologic studies. Data  snw 

      interpreted: Pulse oximetry: on room air is 100 %. Counseling: I had a detailed             

      discussion with the patient and/or guardian regarding: the historical points, exam          

      findings, and any diagnostic results supporting the discharge/admit diagnosis, the          

      presence of at least one elevated blood pressure reading (>120/80) during this              

      emergency department visit, lab results, radiology results, to return to the emergency      

      department if symptoms worsen or persist or if there are any questions or concerns that     

      arise at home. Special discussion: Based on the history and exam findings, there is no      

      indication for further emergent testing or inpatient evaluation. I discussed with the       

      patient/guardian the need to see the primary care provider for further evaluation of        

      the symptoms.                                                                               

                                                                                                  

                                                                                             

16:39 Order name: Basic Metabolic Panel; Complete Time: 17:45                                 snw 

                                                                                             

16:39 Order name: CBC with Diff; Complete Time: 17:50                                         snw 

                                                                                             

16:39 Order name: Hepatic Function; Complete Time: 17:45                                      snw 

                                                                                             

16:39 Order name: Lipase; Complete Time: 17:45                                                snw 

                                                                                             

16:39 Order name: Urine Culture                                                               snw 

                                                                                             

16:39 Order name: Urine Microscopic Only; Complete Time: 17:45                                snw 

                                                                                             

16:52 Order name: Urine Dipstick--Ancillary (enter results); Complete Time: 17:45             eb  

                                                                                             

16:52 Order name: Urine Pregnancy--Ancillary (enter results); Complete Time: 17:45            eb  

                                                                                             

16:53 Order name: COVID-19                                                                    snw 

                                                                                             

17:28 Order name: Glucose, Ancillary Testing; Complete Time: 17:45                            EDMS

                                                                                             

18:10 Order name: CT Stone Protocol; Complete Time: 19:27                                     snw 

                                                                                             

16:39 Order name: IV Saline Lock; Complete Time: 17:48                                        snw 

                                                                                             

16:39 Order name: Labs collected and sent; Complete Time: 17:48                               snw 

                                                                                             

16:39 Order name: Urine Pregnancy Test (obtain specimen); Complete Time: 16:50                snw 

                                                                                             

16:39 Order name: Urine Dipstick-Ancillary (obtain specimen); Complete Time: 16:50            snw 

                                                                                                  

Administered Medications:                                                                         

17:00 Drug: Simethicone 240 mg Route: PO;                                                     7 

18:00 Follow up: Response: No adverse reaction; No change in condition                        7 

17:10 Drug: TORadol 30 mg Route: IVP; Site: left antecubital;                                 jl7 

18:00 Follow up: Response: No adverse reaction; Pain is unchanged, physician notified         7 

18:10 Drug: Rocephin 1 grams Route: IV; Rate: calculated rate; Site: left antecubital;        jl7 

18:13 Follow up: Response: No adverse reaction; IV Status: Completed infusion                 jl7 

19:02 Drug: Ativan 1 mg Route: IVP; Site: left antecubital;                                   jl7 

20:00 Follow up: Response: No adverse reaction                                                ll2 

19:03 Drug: NS 0.9% 1000 ml Route: IV; Rate: 1 bolus; Site: left antecubital;                 7 

20:00 Follow up: Response: No adverse reaction; IV Status: Completed infusion; IV Intake:     ll2 

      1000ml                                                                                      

19:03 Drug: Bentyl 20 mg Route: PO;                                                           7 

20:00 Follow up: Response: No adverse reaction                                                ll2 

20:33 Drug: fentaNYL (PF) 25 mcg Route: IVP; Site: left antecubital;                          2 

20:34 Follow up: Response: Medication administered at discharge.; RASS: Alert and Calm (0)    ll2 

                                                                                                  

                                                                                                  

Disposition:                                                                                      

                                                                                             

07:13 Co-signature as Attending Physician, Wilner Tamez MD.                                    rn  

                                                                                                  

Disposition:                                                                                      

20 19:29 Discharged to Home. Impression: Generalized abdominal pain, Urinary tract          

  infection, site not specified.                                                                  

- Condition is Stable.                                                                            

- Discharge Instructions: Abdominal Pain, Adult, Back Pain, Adult, Urinary Tract                  

  Infection, Adult, Fatigue, Rehydration, Adult, Jennings Diet.                                      

- Prescriptions for Augmentin 875- 125 mg Oral Tablet - take 1 tablet by ORAL route               

  every 12 hours for 10 days; 20 tablet. promethazine 25 mg Oral Tablet - take 1 tablet           

  by ORAL route every 6 hours As needed; 20 tablet.                                               

- Work release form, Medication Reconciliation Form, Thank You Letter, Antibiotic                 

  Education, Prescription Opioid Use form.                                                        

- Follow up: Emergency Department; When: As needed; Reason: Worsening of condition.               

  Follow up: Private Physician; When: 2 - 3 days; Reason: Recheck today's complaints,             

  Continuance of care, Re-evaluation by your physician.                                           

                                                                                                  

                                                                                                  

                                                                                                  

Signatures:                                                                                       

Dispatcher [employer]                           EDAga Howell, ARIELLA-C                   FNP-Csnw                                                  

Wilner Tamez MD MD rn Baxter, Heather RN                     RN   Gurjit Flores RN                        RN   jl7                                                  

Hina Shelby RN                    RN   ll2                                                  

                                                                                                  

Corrections: (The following items were deleted from the chart)                                    

                                                                                             

20:35 16:42 Allergies: adhesive tape; hb                                                      ll2 

20:35 16:42 PMHx: Anxiety; hb                                                                 ll2 

20:35 16:42 PMHx: Hypertension; hb                                                            ll2 

20:35 16:42 PMHx: mitral valve prolapse; hb                                                   ll2 

20:35 16:42 PMHx: MVP; hb                                                                     ll2 

20:35 19:29 2020 19:29 Discharged to Home. Impression: Generalized abdominal pain;      ll2 

      Urinary tract infection, site not specified. Condition is Stable. Forms are Medication      

      Reconciliation Form, Thank You Letter, Antibiotic Education, Prescription Opioid Use.       

      Follow up: Emergency Department; When: As needed; Reason: Worsening of condition.           

      Follow up: Private Physician; When: 2 - 3 days; Reason: Recheck today's complaints,         

      Continuance of care, Re-evaluation by your physician. snw                                   

                                                                                                  

**************************************************************************************************

## 2020-11-28 NOTE — RAD REPORT
EXAM DESCRIPTION:  CT - Stone Protocol - 11/28/2020 7:11 pm

 

CLINICAL HISTORY:  Flank pain.

DISTENTION

 

COMPARISON:  Abdomen   Pelvis W Contrast dated 5/20/2018

 

TECHNIQUE:  Axial images were obtained without oral or IV contrast. Lack of contrast limits solid org
an and vascular assessment. The field-of-view spans the entirety of the  system partially obscuring
 uppermost abdomen and lung bases. Coronal reformatted images were obtained and reviewed.

 

All CT scans are performed using dose optimization technique as appropriate and may include automated
 exposure control or mA/KV adjustment according to patient size.

 

FINDINGS:  The lower lung fields are clear.

 

Imaged portions of the liver and spleen show no suspicious findings on non-contrast imaging. The panc
reas and adrenal glands are normal. No pathologic lymphadenopathy in the abdomen or pelvis.

 

No urinary tract stones or obstructive uropathy.

 

No bowel obstruction, free air, free fluid or abscess. Normal appendix noted.

 

No significant bony abnormality.

 

IMPRESSION:  No urinary tract stones or obstructive uropathy.

## 2020-11-28 NOTE — XMS REPORT
Clinical Summary

                          Created on:2020



Patient:Dorothy Marcelino

Sex:Female

:1980

External Reference #:[id number]





Demographics







                          Address                   [address] [place] [address]

 

                          Home Phone                [phone]

 

                          Work Phone                [phone]

 

                          Mobile Phone              [phone]

 

                          Email Address             [email or web address]

 

                          Preferred Language        English

 

                          Marital Status            Single

 

                          Nondenominational Affiliation     Unknown

 

                          Race                      White

 

                          Ethnic Group              Not  or 









Author







                          Organization              [facility]

 

                          Address                   [address] [place] [address]









Support







                Name            Relationship    Address         Phone

 

                Freeman Horton     Unavailable     Unavailable     [phone]

 

                Luisa Taveras  Parent          Unavailable     [phone]









Care Team Providers







                    Name                Role                Phone

 

                    Asked,  Pcp         Primary Care Provider Unavailable









Allergies

No Known Active Allergies



Medications







          Medication Sig       Dispensed Refills   Start     End Date  Status



                                                  Date                

 

          metoprolol Take 25 mg by           0                             Activ

e



          tartrate  mouth 2 (two)                                         



          (LOPRESSOR) 25 mg times a day.                                        

 



          tablet                                                      

 

          traMADol (ULTRAM) Take 12.5 mg by           0                         

    Active



          50 mg     mouth every 6                                         



          tabletIndications (six) hours as                                      

   



          : acute pain needed for                                         



                    moderate pain                                         



                    .Acute Pain.                                         

 

          clonAZEPAM Take 0.5 mg by           0                             Acti

ve



          (KlonoPIN) 0.5 MG mouth nightly.                                      

   



          tablet                                                      

 

          DULoxetine every morning.           0                    Acti

ve



          (CYMBALTA) 20 MG                               9                   



          capsule                                                     

 

          albuterol (PROAIR as needed.           0                             A

ctive



          HFA) 90                                                     



          mcg/actuation                                                   



          inhaler                                                     

 

          multivitamin with Take 1 tablet by           0                        

     Active



          minerals tablet mouth daily.                                         

 

          acetaminophen-cod Take 1-2 tablets 30 tablet 0           



          eine (TYLENOL by mouth every 6                     9                

 



          WITH CODEINE #3) (six) hours as                                       

  



          300-30 mg per needed for                                         



          tabletIndications moderate pain                                       

  



          : acute pain for up to 7 days                                         



                    .Acute Pain.                                         

 

          ondansetron Take 1 tablet (4 120 tablet 0         20  

Discontinued



          (ZOFRAN) 4 MG mg total) by                             (St

op Taking at



          tablet    mouth every 6                                         Discha

rge)



                    (six) hours for                                         



                    30 days.                                          

 

          acetaminophen-cod Take 1 tablet by 20 tablet 0           



          eine (TYLENOL mouth every 6                     9         19        



          WITH CODEINE #3) (six) hours as                                       

  



          300-30 mg per needed for                                         



          tabletIndications moderate pain                                       

  



          : acute pain for up to 5 days                                         



                    .acute pain.                                         

 

          docusate sodium Take 1 capsule 60 capsule 0         20

  



          (COLACE) 100 MG (100 mg total)                     9         20       

 



          capsule   by mouth 2 (two)                                         



                    times a day as                                         



                    needed for                                         



                    constipation for                                         



                    up to 30 days.                                         

 

          ibuprofen (ADVIL) Take 1 tablet 40 tablet 0         20

  



          600 MG tablet (600 mg total)                     9         20        



                    by mouth every 6                                         



                    (six) hours as                                         



                    needed for mild                                         



                    pain for up to                                         



                    30 days.                                          







Active Problems







                          Problem                   Noted Date

 

                          Pelvic pain               2019

 

                          Leiomyoma of uterus       2019







Encounters







             Date         Type         Specialty    Care Team    Description

 

             2019   Anesthesia Event Obstetrics and Júnior, Alejandra 



                                                Gynecology      MD Sarah



                                        



                                                    [facility] 



                                                    Zofia glasgow NP           

 

             2019   Surgery      Obstetrics and Britton Huerta ABDOMINAL



                                       Gynecology   MD MARILUZ       MYOMECTOMY

 

             2019 - Hospital Encounter General Internal Britton Huerta

yoma of 

uterus;



             2019                Medicine     MD MARILUZ       Pelvic pain

 

             2019   Pre-Admit Testing Pre-Admission Britton Huerta Pre-op te

sting



                          Appointment  Testing      MD MARILUZ       (Primary Dx)



after 2019



Surgical History







                Surgery         Date            Site/Laterality Comments

 

                SALPINGECTOMY   2019 -                      for endometriosi

s



                                2019                       

 

                ADENOIDECTOMY   1994 -                      



                                1994                      

 

                REDUCTION MAMMAPLASTY 2001 -      Bilateral       



                                2001                      

 

                ANKLE FRACTURE SURGERY 1997 -      Bilateral       



                                1997                      

 

                MYOMECTOMY, ABDOMINAL 2019      Abdomen/N/A     Procedure:

 ABDOMINAL



                APPROACH                                        MYOMECTOMY;  Geena

geon:



                                                                Britton Huerta MD;



                                                                Location: [place] [address] OR;



                                                                Service: Obstetr

ics and



                                                                Gynecology;  Lat

erality:



                                                                N/A;







Medical History







                    Medical History     Date                Comments

 

                    MVP (mitral valve prolapse)                     

 

                    Hypertension                            

 

                    Anxiety                                 

 

                    Palpitations                            on beta blocker; las

t episode 2019? Dr. Woody(cardio) last s

een 2019

 

                    Does not exercise                       no chest pain or sob

 on stairs

 

                    Childhood asthma                        had some wheezing se

veral months ago and



                                                            was prescribed with 

inhaler but doesn't use



                                                            it anymore

 

                    Anxiety and depression                     

 

                    Arthritis                               

 

                    Leiomyoma of uterus                     

 

                    Pelvic pain                             

 

                    Chronic back pain                       lumbar bulging disc

 

                    Abnormal bleeding in menstrual cycle                     

 

                    Back problem                            back pains from bulg

ing disc

 

                    Mitral valve prolapse                     







Social History







             Tobacco Use  Types        Packs/Day    Years Used   Date

 

             Current Some Day Smoker Cigarettes                             









                Smokeless Tobacco: Never Used                                 









                                        Tobacco Cessation: Ready to Quit: No; Co

unseling Given: Yes



                                        Comments: 3 cigs/day









                Alcohol Use     Drinks/Week     oz/Week         Comments

 

                Yes                                             socially









                          Sex Assigned at Birth     Date Recorded

 

                          Not on file               







Last Filed Vital Signs







                Vital Sign      Reading         Time Taken      Comments

 

                Blood Pressure  119/65          2019  7:25 AM CST 

 

                Pulse           63              2019  7:25 AM CST 

 

                Temperature     36.2 C (97.1 F) 2019  7:25 AM CST 

 

                Respiratory Rate 19              2019  7:25 AM CST 

 

                Oxygen Saturation 100%            2019  7:25 AM CST 

 

                Inhaled Oxygen Concentration -               -               

 

                Weight          54.7 kg (120 lb 8 oz) 2019  7:04 AM CST 

 

                Height          154.9 cm (5' 1") 2019  7:04 AM CST 

 

                Body Mass Index 22.77           2019  7:04 AM CST 







Plan of Treatment







                Health Maintenance Due Date        Last Done       Comments

 

                CERVICAL CANCER SCREENING 2001                      

 

                INFLUENZA VACCINE 2020                      







Procedures







             Procedure Name Priority     Date/Time    Associated   Comments



                                                    Diagnosis    

 

             ESTIMATED GFR Routine      2019  5:15              Results fo

r this



                                       AM CST                    procedure are i

n



                                                                 the results



                                                                 section.

 

             CBC HEMOGRAM Routine      2019  5:15              Results for

 this



                                       AM CST                    procedure are i

n



                                                                 the results



                                                                 section.

 

             BASIC METABOLIC PANEL Routine      2019  5:15              Re

sults for this



                                       AM CST                    procedure are i

n



                                                                 the results



                                                                 section.

 

             SURGICAL PATHOLOGY Routine      2019  1:06              Resul

ts for this



             REQUEST                   PM CST                    procedure are i

n



                                                                 the results



                                                                 section.

 

             AK AN PERIPHERAL Routine      2019 11:03              Results

 for this



             BLOCK PROCEDURE FOR              AM CST                    procedur

e are in



             PAIN                                                the results



                                                                 section.

 

             AK AN ELECTIVE Routine      2019  9:24              Results f

or this



             ENDOTRACHEAL AIRWAY              AM CST                    procedur

e are in



                                                                 the results



                                                                 section.

 

                MYOMECTOMY, ABDOMINAL                 2019  8:58 Leiomyoma

 of uterus



                                        



             APPROACH                  AM CST       Pelvic pain  









                                                    Special Needs







                                                    EST 90 MINS









             ECG PRE/POST OP Routine      2019  1:23 PM Pre-op testing Res

ults for this



                                       CST                       procedure are i

n the



                                                                 results section

.

 

             ESTIMATED GFR Routine      2019  1:16 PM              Results

 for this



                                       CST                       procedure are i

n the



                                                                 results section

.

 

             TYPE AND SCREEN Routine      2019  1:16 PM Pre-op testing Res

ults for this



                                       CST                       procedure are i

n the



                                                                 results section

.

 

             HCG QUALITATIVE, Routine      2019  1:16 PM Pre-op testing Re

sults for this



             SERUM SCREEN              CST                       procedure are i

n the



                                                                 results section

.

 

             BASIC METABOLIC PANEL Routine      2019  1:16 PM Pre-op testi

ng Results for 

this



                                       CST                       procedure are i

n the



                                                                 results section

.

 

             CBC HEMOGRAM Routine      2019  1:16 PM Pre-op testing Result

s for this



                                       CST                       procedure are i

n the



                                                                 results section

.



after 2019



Results

Estimated GFR (2019  5:15 AM CST)Only the most recent of2 resultswithin 
the time period is included.





             Component    Value        Ref Range    Performed At Pathologist



                                                                 Signature

 

             Estimated GFR >=90         mL/min/1.73  [facility] 



                          Comment:     m2           HOSPITAL     



                          Catergory Units  Interpretation                 

          



                          G1     >=90   Normal or high              

             



                          G2     60-89  Mildly decreased            

               



                          G3a    45-59  Mildly to moderately decreas

ed                           



                          G3b    30-44  Moderately to severely decre

ased                           



                          G4     15-29  Severely decreased          

                 



                          G5     <15   Kidney failure             

              



                          The eGFR was calculated using the Chronic Kidney Disea

se                           



                          Epidemiology Collaboration (CKD-EPI) equation.        

                   



                          Interpretation is based on recommendations of the     

                      



                          National Kidney Foundation-Kidney Disease Outcomes Alexander

lity                           



                          Initiative (NKF-KDOQI) published in 2014.             

              



                                                                 









                                        Specimen

 

                                        Plasma specimen









                Performing Organization Address         City/State/ZIP Code Phon

e Number

 

                [facility] DEPARTMENT OF PATHOLOGY AND [address], [place] [address]

0 



                GENOMIC MEDICINE                                 

 

                [facility] [address] [place] [address] 



CBC hemogram (2019  5:15 AM CST)Only the most recent of2 resultswithin the
time period is included.





             Component    Value        Ref Range    Performed At Pathologist



                                                                 Signature

 

             WBC          14.01 (H)    4.50 - 11.00 [facility]     

 

             RBC          3.46 (L)     4.20 - 5.50  [facility]     

 

             HGB          10.9 (L)Comment: 12.0 - 16.0  [facility] 



                          Results double g/dL         HOSPITAL     



                          checked.                               

 

             HCT          35.2 (L)     37.0 - 47.0 % [facility]     

 

             MCV          101.7 (H)    82.0 - 100.0 fL [facility]     

 

             MCH          31.5         27.0 - 34.0 pg [facility]     

 

             MCHC         31.0         31.0 - 37.0  [facility] 



                                       g/dL         [facility]     

 

             RDW - SD     47.3         37.0 - 55.0 fL [facility]     

 

             MPV          11.0         8.8 - 13.2 fL [facility]     

 

             Platelet count 209          150 - 400 k/uL [facility]     

 

             Nucleated RBC 0.00         /100 WBC     [facility]     









                                        Specimen

 

                                        Blood









                Performing Organization Address         City/[facility]/ZIP Code Phon

e Number

 

                [facility] DEPARTMENT OF PATHOLOGY AND [address] [place] [address]

0 



                [address] [address] 



Basic metabolic panel (2019  5:15 AM CST)Only the most recent of2 results
within the time period is included.





             Component    Value        Ref Range    Performed At Pathologist Sig

nature

 

             Sodium       134 (L)      135 - 148 mEq/L [facility]

L 

 

             Potassium    4.1          3.5 - 5.0 mEq/L [facility]

L 

 

             Chloride     102          98 - 112 mEq/L [facility]

 

 

             CO2          23 (L)       24 - 31 mEq/L [facility] 

 

             Anion gap    9@ANIO       7 - 15 mEq/L [facility] 

 

             BUN          13           6 - 20 mg/dL [facility] 

 

             Creatinine   0.57         0.50 - 0.90 mg/dL [facility]

NADEGE 

 

             Glucose      144 (H)      65 - 99 mg/dL [facility] 

 

             Calcium      8.6          8.3 - 10.2 [facility]dL [facility]

[facility] 









                                        Specimen

 

                                        Plasma specimen









                Performing Organization Address         City/[facility]/ZIP Code Phon

e Number

 

                [facility] DEPARTMENT OF PATHOLOGY AND [address], [place] [address]

0 



                [address] [address] 



Surgical pathology request (2019  1:06 PM CST)





             Component    Value        Ref Range    Performed At Pathologist



                                                                 Signature

 

             Case number  [id number]              [facility] DEPARTMENT OF 



                                                    PATHOLOGY AND 



                                                    GENOMIC MEDICINE 

 

             Surgical pathology See link below              [facility] DEPARTMENT OF 



             report       for PDF Lab               PATHOLOGY AND 



                          Report                    GENOMIC MEDICINE 

 

             Result status This is Final              [facility] DEPARTMENT OF 



                          Report for                PATHOLOGY AND 



                          [id number]              GENOMIC MEDICINE 









                                        Specimen

 

                                        









                Performing Organization Address         City/[facility]/ZIP Code Phon

e Number

 

                [facility] DEPARTMENT OF PATHOLOGY AND [facility] [address] [place] [address]

0 



                [facility] MEDICINE                                 



Peripheral Block (2019 11:03 AM CST)





                          Narrative                 Performed At

 

                                        Feli Peterson MD   2019 1

1:04 AM



                                        



                                        Peripheral Block



                                        



                                        Date/Time: 2019 11:04 AM



                                        



                                        Performed by: Feli Peterson MD



                                        



                                        Authorized by: Alejandra Callejas MD 



                                        



                                         



                                        



                                        Patient Location: OR (OR)



                                        



                                        Start Time: 2019 11:03 AM



                                        



                                        End Time: 2019 11:03 AM



                                        



                                        Reason for Block: at surgeon's request, 

post-op pain management 



                                        



                                        Staff: 



                                        



                                         Anesthesiologist: Feli Peterson MD



                                        



                                         Performed by: Anesthesiologist



                                        



                                        Preprocedure: patient identified, IV malinda

cked, site and side verified, 



                                        



                                        risks and benefits discussed, procedure 

verified, surgical consent 



                                        



                                        complete, patient position confirmed, mo

nitors and equipment checked, 



                                        



                                        pre-op evaluation complete and site jaquelin

ed 



                                        



                                        Peripheral Nerve Block: 



                                        



                                         Patient Position: Supine



                                        



                                         Prep: Betadine 



                                        



                           Monitoring: Blood pressure monitoring, continuous

 pulse oximetry, 



                                        CO2 



                                        



                                        and heart rate



                                        



                                        Block Type: TAP



                                        



                                        Laterality: Bilateral



                                        



                                        Injection Technique: Single injection



                                        



                                        Procedures: ultrasound guided 



                                        



                                        Ultrasound documentation: Stored



                                        



                                        Needle: 



                                        



                                         Needle Type: Pajunk



                                        



                                        Assessment: 



                                        



                           Injection Assessment: Intermittent aspiration dur

ing local 



                                        anesthetic 



                                        



                                        administration, no symptoms of intraneur

al/intravenous injection and 



                                        



                                        needle tip visualized at all times durin

g injection of medication



                                        



                                         Paresthesia Pain: None



                                        



                                         Heart Rate Change: No 



                                        



                                         Slow Fractionated Injection: Yes 



                                        



                                         Block outcome: No apparent complica

tions and patient tolerated 



                                        



                                        procedure well



                                        



                                        Medications Administered



                                        



                                        Bupivacaine 0.25 % PF (mL), 20 mL



                                        



                                        bupivacaine liposome (EXPAREL), 20 mL



                                        



                                                    



Airway (2019  9:24 AM CST)





                          Narrative                 Performed At

 

                                        Alejandra Callejas MD   2019 9:

25 AM



                                        



                                        Airway



                                        



                                        Performed by: Alejandra Callejas MD



                                        



                                        Authorized by: Alejandra Callejas MD 



                                        



                                         



                                        



                                        Location: [place]



                                        



                                        Urgency: Elective



                                        



                                        Difficult Airway: No 



                                        



                                        Preoxygenated with 100% O2: Yes 



                                        



                                        C-spine Precautions Maintained Throughou

t: Yes 



                                        



                                        Mask Ventilation: Easy mask



                                        



                                        Final Airway Type: Endotracheal airway



                                        



                                        Final Endotracheal Airway: ETT



                                        



                                        Cuffed: Yes 



                                        



                                        Technique Used: Direct laryngoscopy



                                        



                                        Devices/Methods Used in Placement: Int

ubating stylet



                                        



                                        Insertion Site: Oral



                                        



                                        Blade Type: Ochoa



                                        



                                        Laryngoscope Blade/Videolaryngoscope Jose Manuel

de Size: 2



                                        



                                        ETT Size (mm): 7.0



                                        



                                        Cuff at minimum occlusion pressure: Yes 





                                        



                                        Measured from: Gums



                                        



                                        Placement Verified by: CO2 detection and

 direct visualization 



                                        



                                        Laryngoscopic view: Grade I - full vie

w of glottis



                                        



                                        Rapid Sequence Induction (RSI): No 



                                        



                                        Modified RSI: No 



                                        



                          Number of Attempts at Approach: 1 



ECG Pre/Post Op (2019  1:23 PM CST)





             Component    Value        Ref Range    Performed At Pathologist Sig

nature

 

             Ventricular rate 67                        [facility]     

 

             Atrial rate  67                        [facility]     

 

             AK interval  144                       [facility]     

 

             QRSD interval 78                        [facility]     

 

             QT interval  432                       [facility]     

 

             QTC interval 456                       [facility]     

 

             P axis 1     74                        [facility]     

 

             QRS axis 1   72                        [facility]     

 

             T wave axis  50                        [facility]     

 

             EKG impression Normal sinus              [facility]     



                          rhythm-Possible Left                           



                          atrial                                 



                          enlargement-Borderline                           



                          ECG-No previous ECGs                           



                          available-Electronicall                           



                          y Signed By Nura SUNSHINE, Jhon KAMINSKI ([id number]) on                           



                          2019 7:39:40 AM                           









                                        Specimen

 

                                        









                          Narrative                 Performed At

 

                                        This result has an attachment that is no

t available.



                                        









                Performing Organization Address         City/[facility]/ZIP Code Phon

e Number

 

                [facility]        [address] [place] [id number] 



Type and screen (2019  1:16 PM CST)





             Component    Value        Ref Range    Performed At Pathologist Sig

nature

 

             ABO grouping O                         [facility]     

 

             Rh type      POS                       [facility]     

 

             Antibody screen (gel) NEG                       [facility]     









                                        Specimen

 

                                        Blood









                Performing Organization Address         City/[facility]/ZIP Code Phon

e Number

 

                [facility] DEPARTMENT OF PATHOLOGY AND [address] [place] [address]

0 



                [facility] [address] [address] 



hCG qualitative, serum screen (2019  1:16 PM CST)





             Component    Value        Ref Range    Performed At Pathologist



                                                                 Signature

 

             hCG qualitative, NegativeComment:              [facility] 



             serum        Sensitivity of HCG              HOSPITAL     



                          test: 25 mIU/mL                           









                                        Specimen

 

                                        Blood









                Performing Organization Address         City/State/ZIP Code Phon

e Number

 

                [facility] DEPARTMENT OF PATHOLOGY AND [address] [place] [address]

0 



                [address] [address] 



after 2019



Insurance







          Payer     Benefit Plan / Group Subscriber ID Effective Dates Phone    

 Address   Type

 

          [employer]      SAVANNAH RADHA KIMANI [id number] 2019-Present             

        PPO









           Guarantor Name Account Type Relation to Date of    Phone      Billing



                                 Patient    Birth                 Address

 

           Dorothy Marcelino Personal/Family Self       1980 [phone] [address]                                                (Home)



                                        [place]



                                                       [phone]



                                                       (Work)     







Advance Directives

For more information, please contact: [phone]





                Type            Date Recorded   Patient Representative Explanati

on

 

                Advance Directives, Living Will and                             

    



                Medical Power of

## 2020-11-28 NOTE — XMS REPORT
Continuity of Care Document

                          Created on:2020



Patient:LEXI WALKER

Sex:Female

:1980

External Reference #:[id number]





Demographics







                          Address                   [address] [place] [address]

 

                          Home Phone                [phone]

 

                          Work Phone                [phone]

 

                          Mobile Phone              [phone]

 

                          Email Address             [email or web address]

 

                          Preferred Language        English

 

                          Marital Status            Unknown

 

                          Gnosticism Affiliation     Unknown

 

                          Race                      Unknown

 

                          Additional Race(s)        Unavailable



                                                    White

 

                          Ethnic Group              Unknown









Author







                          Organization              [facility]

t

 

                          Address                   [address]



                                                    [place] [address]

 

                          Phone                     [phone]









Support







                Name            Relationship    Address         Phone

 

                Horton           Other           Unavailable     [phone]

 

                Nitin         Parent          Unavailable     [phone]









Care Team Providers







                    Name                Role                Phone

 

                    Asked,  Pcp         Primary Care Physician Unavailable

 

                    Bradley SUNSHINE,  TInez        Attending Clinician [phone]

 

                    Sarah Callejas MD Attending Clinician [phone]

 

                    Boone COLEMAN Attending Clinician [phone]









Payers







           Payer Name Policy Type Policy     Effective Date Expiration Date Sour

ce



                                 Number                           

 

           [employer] RADHA            [id number] 2019            [place]



           [id number]                       00:00:00              [facility]

t



           2019-[place]                                             



           tPPO                                                   







Problems







       Condition Condition Condition Status Onset  Resolution Last   Treating Co

mments 

Source



       Name   Details Category        Date   Date   Treatment Clinician        



                                                 Date                 

 

       Genital Genital Problem Active                              [facility]



       warts  Warts                6-24                               da



                                   00:00:                             Medical



                                   00                                 Group

 

       Family Family Problem Active                              [facility]



       history of History of               6-23                               da



       malignant Malignant               00:00:                             Medi

issac



       neoplasm Neoplasm               00                                 Group



       of breast of Breast                                                  



       in first in First                                                  



       degree Degree                                                  



       relative Relative                                                  

 

       Pelvic Pelvic Disease Active 2019                             [place]



       pain   pain                 2-26                               [facility]



                                   00:00:                             st



                                   00                                 

 

       Leiomyoma Leiomyoma Disease Active 2019                             [facility]

[place]



       of uterus of uterus               2-24                               [facility]

aracelis



                                   00:00:                             st



                                   00                                 

 

       Hamartoma Hamartoma Problem Active                              Mat

agor



                                   6-26                               da



                                   00:00:                             Medical



                                   00                                 Group

 

       Mixed  Mixed  Problem Active                              [facility]



       anxiety Anxiety               6-05                               da



       and    and                  00:00:                             Medical



       depressive Depressive               00                                 Gr

oup



       disorder Disorder                                                  

 

       Dysmenorrh Dysmenorrh Problem Active                              M

michelle



       ea     ea                   6-05                               da



                                   00:00:                             Medical



                                   00                                 Group

 

       Premenstru Premenstru Problem Active                              M

michelle



       al     al                   605                               da



       dysphoric Dysphoric               00:00:                             Medi

issac



       disorder Disorder               00                                 Group

 

       Menorrhagi Menorrhagi Problem Active                              M

michelle



       a      a                    6-05                               da



                                   00:00:                             Medical



                                   00                                 Group

 

       Increased Increased Problem Active                              Mat

agor



       frequency Frequency               6-05                               da



       of     of                   00:00:                             Medical



       urination Urination               00                                 Grou

p







Allergies, Adverse Reactions, Alerts







       Allergy Allergy Status Severity Reaction(s) Onset  Inactive Treating Comm

ents 

Source



       Name   Type                        Date   Date   Clinician        

 

       Demerol Allergy Active Moderate Other                              [facility]

r



              to                                                      da



              substanc                                                  Medical



              e                                                       Group







Social History







           Social Habit Start Date Stop Date  Quantity   Comments   Source

 

           History of                       Cigarette Smoker            [place] 

[facility]



           tobacco use                                             

 

           Sex Assigned At                                             [facility]

[facility]ist



           Birth                                                  

 

           Tobacco use and 2019 Never used            [facility]

ethodist



           exposure   00:00:00   00:00:00                         

 

           Alcohol intake 2019 Current drinker            [facility]

[facility]



                      00:00:00   00:00:00   of alcohol            



                                            (finding)             

 

           Tobacco Comment 2019 3 cigs/day            [facility]

ethodist



                      00:00:00   00:00:00                         

 

           Alcohol Comment 2019 socially              [facility]

ethodist



                      00:00:00   00:00:00                         









                Smoking Status  Start Date      Stop Date       Source

 

                Light Tobacco Smoker                                 [facility]

[facility]

 

                Current some day smoker 2019 00:00:00                 [facility]

[facility]







Medications







       Ordered Filled Start  Stop   Current Ordering Indication Dosage Frequency

 Signature

                    Comments            Components          Source



     Medication Medication Date Date Medication? Clinician                (SIG) 

          



     Name Name                                                   

 

     metoprolol      2019      Yes            25mg Q.5D Take 25 mg           [facility]

[place]



     tartrate      2-26                               by mouth 2           [facility]

di



     (LOPRESSOR)      13:27:                               (two)           st



     25 mg      28                                 times a           



     tablet                                         day.           

 

     traMADol      2019      Yes       acute pain 12.5mg Q6H  Take 12.5       

    [facility]



     (ULTRAM) 50      2-26                               mg by           [facility]



     mg tablet      13:27:                               mouth           st



               28                                 every 6           



                                                  (six)           



                                                  hours as           



                                                  needed for           



                                                  moderate           



                                                  pain           



                                                  .Acute           



                                                  Pain.           

 

     clonAZEPAM      2019      Yes            .5mg QD   Take 0.5           [facility]

ston



     (KlonoPIN)      2-26                               mg by           [facility]



     0.5 MG      13:27:                               mouth           st



     tablet      28                                 nightly.           

 

     albuterol      2019      Yes                      as needed.           [facility]



     (PROAIR      -                                              [facility]



     HFA) 90      13:27:                                              st



     mcg/actuati      28                                                



     on inhaler                                                        

 

     multivitami      2019      Yes            1{tbl} QD   Take 1           [facility] ornelas



     n with      2-26                               tablet by           [facility]



     minerals      13:27:                               mouth           st



     tablet      28                                 daily.           

 

     docusate      2019- No             100mg Q.5D Take 1           [place]

on



     sodium                                capsule           [facility]



     (COLACE)      00:00: 23:59                          (100 mg           st



     100 MG      00   :00                           total) by           



     capsule                                         mouth 2           



                                                  (two)           



                                                  times a           



                                                  day as           



                                                  needed for           



                                                  constipati           



                                                  on for up           



                                                  to 30           



                                                  days.           

 

     ibuprofen      2019- No             600mg Q6H  Take 1           [facility]

[facility]



     (ADVIL) 600                                tablet           [facility]

di



     MG tablet      00:00: 23:59                          (600 mg           st



               00   :00                           total) by           



                                                  mouth           



                                                  every 6           



                                                  (six)           



                                                  hours as           



                                                  needed for           



                                                  mild pain           



                                                  for up to           



                                                  30 days.           

 

     acetaminoph      2019- No        acute pain 1{tbl} Q6H  Take 1      

     [facility]



     en-codeine      -                          tablet by           [facility]



     (TYLENOL      00:00: 23:59                          mouth           st



     WITH      00   :00                           every 6           



     CODEINE #3)                                         (six)           



     300-30 mg                                         hours as           



     per tablet                                         needed for           



                                                  moderate           



                                                  pain for           



                                                  up to 5           



                                                  days           



                                                  .acute           



                                                  pain.           

 

     DULoxetine      2019      Yes                 QD   every           [place]

n



     (CYMBALTA)      2-11                               morning.           [facility]

di



     20 MG      00:00:                                              st



     capsule      00                                                

 

     ondansetron      2019 2019- No             4mg  Q6H  Take 1           [facility]

ston



     (ZOFRAN) 4      -26                          tablet (4           Met

hodi



     MG tablet      00:00: 00:00                          mg total)           st



               00   :00                           by mouth           



                                                  every 6           



                                                  (six)           



                                                  hours for           



                                                  30 days.           

 

     acetaminoph      2019- No        acute pain 1{tbl} Q6H  Take 1-2    

       [facility]



     en-codeine       11-30                          tablets by           [facility]



     (TYLENOL      00:00: 23:59                          mouth           st



     WITH      00   :00                           every 6           



     CODEINE #3)                                         (six)           



     300-30 mg                                         hours as           



     per tablet                                         needed for           



                                                  moderate           



                                                  pain for           



                                                  up to 7           



                                                  days           



                                                  .Acute           



                                                  Pain.           

 

     clonazepam clonazepam           No                       clonazepam        

   [facility]



     1 mg tablet 1 mg tablet                                    1 mg           d

a



                                                  tablet           Medical



                                                                 Group

 

     duloxetine duloxetine           No                       duloxetine        

   [facility]



     20 mg 20 mg                                    20 mg           da



     capsule,del capsule,del                                    capsule,de      

     Medical



     ayed ayed                                    layed           Group



     release release                                    release           

 

     metoprolol metoprolol           No                       metoprolol        

   [facility]



     tartrate 25 tartrate 25                                    tartrate        

   da



     mg tablet mg tablet                                    25 mg           Medi

issac



                                                  tablet           Group







Vital Signs







             Vital Name   Observation Time Observation Value Comments     Source

 

             BP Diastolic 2020 00:00:00 95 mm[Hg]                 [place]

a Medical



                                                                 Group

 

             Height       2020 00:00:00 61 [in_i]                 [place]

a Medical



                                                                 Group

 

             BMI (Body Mass 2020 00:00:00 23.6 kg/m2                [facility]



             Index)                                              Group

 

             BP Systolic  2020 00:00:00 136 mm[Hg]                [place]

a Medical



                                                                 Group

 

             Body Weight  2020 00:00:00 124.7 [lb_av]              [facility]

da Medical



                                                                 Group

 

             BP Diastolic 2020 00:00:00 96 mm[Hg]                 [place]

a Medical



                                                                 Group

 

             Height       2020 00:00:00 61 [in_i]                 [place]

a Medical



                                                                 Group

 

             BMI (Body Mass 2020 00:00:00 23.4 kg/m2                [facility]



             Index)                                              Group

 

             BP Systolic  2020 00:00:00 129 mm[Hg]                [place]

a Medical



                                                                 Group

 

             Body Weight  2020 00:00:00 124.1 [lb_av]              [facility]

da Medical



                                                                 Group

 

             Systolic blood 2019 07:25:06 119 mm[Hg]                [facility]

[facility]



             pressure                                            

 

             Diastolic blood 2019 07:25:06 65 mm[Hg]                 [facility]

[facility]



             pressure                                            

 

             Heart rate   2019 07:25:06 63 /min                   [facility]

 

             Body temperature 2019 07:25:06 36.17 Patria                 [facility]

 

             Respiratory rate 2019 07:25:06 19 /min                   [facility]

 

             Oxygen saturation in 2019 07:25:06 100 /min                  

Fernando Flynn



             Arterial blood by                                        



             Pulse oximetry                                        

 

             Body height  2019 07:04:00 154.9 cm                  Fernando Flynn

 

             Body weight  2019 07:04:00 54.658 kg                 Fernando Flynn

 

             BMI          2019 07:04:00 22.77 kg/m2               [facility]ernando Flynn







Procedures







                Procedure       Date / Time     Performing Clinician Source



                                Performed                       

 

                BASIC METABOLIC PANEL 2019 05:15:00 Silviano Lorena Flynn

 

                CBC HEMOGRAM    2019 05:15:00 Silviano Lorena A Fernando maddox

 

                ESTIMATED GFR   2019 05:15:00 Britton Huerta

 

                SURGICAL PATHOLOGY 2019 13:06:00 Britton Huerta

[facility]



                REQUEST                                         

 

                ME AN PERIPHERAL BLOCK 2019 11:03:35 Feli Peterson



                PROCEDURE FOR PAIN                                 

 

                ME AN ELECTIVE  2019 09:24:31 Alejandra Callejas



                ENDOTRACHEAL AIRWAY                                 

 

                MYOMECTOMY, ABDOMINAL 2019 08:58:00 Britton Huerta



                APPROACH                                        

 

                ECG PRE/POST OP 2019 13:23:33 Boone Fernando BHAVESH

[facility]ist



                                                Zofia          

 

                CBC HEMOGRAM    2019 13:16:00 Britton Huerta

 

                BASIC METABOLIC PANEL 2019 13:16:00 Britton Huerta

 

                HCG QUALITATIVE, SERUM 2019 13:16:00 Britton Huerta



                SCREEN                                          

 

                TYPE AND SCREEN 2019 13:16:00 Britton Huerta

 

                ESTIMATED GFR   2019 13:16:00 Britton Huerta

 

                Breast Surgery                                  [facility]

l



                                                                Group

 

                ENT Surgery                                     [facility]

l



                                                                Group







Plan of Care







             Planned Activity Planned Date Details      Comments     Source

 

             Future Scheduled 2020   INFLUENZA VACCINE              [facility]



             Test         00:00:00     [code = INFLUENZA              



                                       VACCINE]                  

 

             Future Scheduled 2001   Screening for              [facility] Stanford

[facility]



             Test         00:00:00     malignant neoplasm              



                                       of cervix                 



                                       (procedure) [code =              



                                       563125578]                







Encounters







        Start   End     Encounter Admission Attending Care    Care    Encounter 

Source



        Date/Time Date/Time Type    Type    Clinicians Facility Department ID   

   

 

        2020 Jarad HINESJAE     [place] -    [id number] M

[facility]



        00:00:00 00:00:00 Discovery tiana Arriaga MD: [address] [place] [address]                                              



                        [id number]                                         



                        , Ph. [phone]                                         



                        [phone]                                         

 

        2020 Jaradaugustus DANIELS     [place] -    [id number] M

[facility]



        00:00:00 00:00:00 Discovery tiana Arriaga MD: [address] [place] [address]                                              



                        [phone]                                         



                        , Ph. [phone]                                         



                        [phone]                                         







Results







           Test Description Test Time  Test Comments Results    Result Comments 

Source









                    Urinalysis macro (dipstick) panel - Urine 2020 14:03:5

3 









                      Test Item  Value      Reference Range Interpretation Comme

nts









             Leukocytes (test code = Leukocytes) Negative                       

        

 

             Nitrite (test code = Nitrite) negative                             

  

 

             Urobilinogen (test code = Urobilinogen) 1                          

            

 

             Protein (test code = Protein) Negative                             

  

 

             pH (test code = pH) 6.0                                    

 

             Blood (test code = Blood) Negative                               

 

             Specific Gravity (test code = Specific Gravity) 1.030              

                    

 

             Ketone (test code = Ketone) Small                                  

 

             Bilirubin (test code = Bilirubin) Small                            

      

 

             Glucose (test code = Glucose) Negative                             

  

 

             Appearance (test code = Appearance) Clear                          

        

 

             Color (test code = Color) Yellow                                 



[facility] [id number] 
14:03:53





             Test Item    Value        Reference Range Interpretation Comments

 

             Leukocytes (test code = Leukocytes) Negative                       

        

 

             Nitrite (test code = Nitrite) negative                             

  

 

             Urobilinogen (test code = 1                                      



             Urobilinogen)                                        

 

             Protein (test code = Protein) Negative                             

  

 

             pH (test code = pH) 6.0                                    

 

             Blood (test code = Blood) Negative                               

 

             Specific Gravity (test code = 1.030                                

  



             Specific Gravity)                                        

 

             Ketone (test code = Ketone) Small                                  

 

             Bilirubin (test code = Bilirubin) Small                            

      

 

             Glucose (test code = Glucose) Negative                             

  

 

             Appearance (test code = Appearance) Clear                          

        

 

             Color (test code = Color) Yellow                                 



[facility]Neisseria gonorrhoeae DNA 
[Presence] in Urine by SCOTTIE with probe [id number] 02:15:00





             Test Item    Value        Reference Range Interpretation Comments

 

             Chlamydia sp Ag [Presence] in CT not detected                      

     



             Unspecified specimen (test                                        



             code = 06679-5)                                        

 

             [id number] (test code = hue0218) NG not detected                      

     



[facility] [id number] 12:21:06





             Test Item    Value        Reference Range Interpretation Comments

 

             Case number (test code = [id number]                           



             [id number])                                            

 

             Surgical pathology See link below for                           



             report (test code = PDF Lab Report                           



             7931)                                               

 

             Result status (test code This is Final Report                      

     



             = [id number])   for [id number]                           



[facility] [id number] 06:45:51





             Test Item    Value        Reference Range Interpretation Comments

 

             WBC (test code = 02446-6) 14.01        4.50- 11.00 k/uL H          

  

 

             RBC (test code = 98707-3) 3.46 m/uL    4.2-5.5      L            

 

             HGB (test code = 718-7) 10.9 g/dL    12-16        L            Resu

lts double



                                                                 checked.

 

             HCT (test code = 4544-3) 35.2 %       37-47        L            

 

             MCV (test code = 787-2) 101.7 fL            H            

 

             MCH (test code = 785-6) 31.5 pg      27-34                     

 

             MCHC (test code = 786-4) 31.0 g/dL    31-37                     

 

             RDW - SD (test code = 47.3 fL      37-55                     



             45543-0)                                            

 

             MPV (test code = 81277-9) 11.0 fL      8.8-13.2                  

 

             Platelet count (test code 209          150- 400 k/uL              



             = 33706-9)                                          

 

             Nucleated RBC (test code 0.00         /100 WBC                  



             = 98525-6)                                          

 

             Lab Interpretation (test Abnormal                               



             code = 95497-0)                                        



[facility] metabolic [id number] 06:24:56





             Test Item    Value        Reference Range Interpretation Comments

 

             Sodium (test code = 2951-2) 134          135- 148 mEq/L L          

  

 

             Potassium (test code = 2823-3) 4.1          3.5- 5.0 mEq/L         

     

 

             Chloride (test code = 2075-0) 102          98- 112 mEq/L           

   

 

             CO2 (test code = 8-9) 23           24- 31 mEq/L L            

 

             Anion gap (test code = 33037-3) 9@ANIO       7- 15 mEq/L           

    

 

             BUN (test code = 3094-0) 13 mg/dL     6-20                      

 

             Creatinine (test code = 2160-0) 0.57 mg/dL   0.5-0.9               

    

 

             Glucose (test code = 2345-7) 144 mg/dL    65-99        H           

 

 

             Calcium (test code = 87885-0) 8.6 mg/dL    8.3-10.2                

  

 

             Lab Interpretation (test code = Abnormal                           

    



             10419-6)                                            



[facility] MethodistEstimated [id number] 06:24:56





             Test Item    Value        Reference Range Interpretation Comments

 

             Estimated GFR (test >=90         mL/min/1.73 m2              Milli coffman  Units



             code = 5488)                                        InterpretationG

1



                                                                 >=90     Normal

 or highG2



                                                                       60-89    

Mildly



                                                                 pwlgiunssI5w   

     45-59



                                                                  Mildly to mode

rately



                                                                 nfformmhaI8v   

     30-44



                                                                  Moderately to 

severely



                                                                 decreasedG4    

     15-29



                                                                  Severely decre

asedG5



                                                                   <15      Kidn

ey



                                                                 failureThe eGFR

 was



                                                                 calculated pallavi

g the



                                                                 Chronic Kidney 

Disease



                                                                 Epidemiology Co

llaboration



                                                                 (CKD-EPI) equat

ion.



                                                                 Interpretation 

is based on



                                                                 recommendations

 of the



                                                                 National Kidney



                                                                 Foundation-Kidn

ey Disease



                                                                 Outcomes Qualit

y Initiative



                                                                 (NKF-KDOQI) pub

lished in



                                                                 2014.



[place] MethodistPeripheral [id number] 11:03:35Feli Peterson MD     
2019 11:04 AMPeripheral BlockDate/Time: 2019 11:04 AMPerformedby: 
Feli Peterson, MDAuthorized by: Alejandra Callejas MD  Patient Location:  OR 
(OR)Start Time:  2019 11:03 AMEnd Time:  2019 11:03 AMReason for 
Block: at surgeon's request, post-op pain management  Staff:   Anesthesiologist:
  Feli Peterson MD  Performed by:  AnesthesiologistPreprocedure: patient 
identified, IV checked, site and side verified, risks and benefits discussed, 
procedure verified, surgical consent complete, patient position confirmed, 
monitors and equipment checked, pre-op evaluation complete and site marked  
Peripheral Nerve Block:   Patient Position:  Supine  Prep: Betadine    
Monitoring:  Blood pressure monitoring, continuous pulse oximetry, CO2 and heart
 rateBlock Type:  TAPLaterality:  BilateralInjection Technique:  Single 
injectionProcedures: ultrasound guided Ultrasound documentation:  StoredNeedle: 
  Needle Type:  PajunkAssessment:   Injection Assessment:Intermittent aspiration
 during local anesthetic administration, no symptoms of intraneural/intravenous 
injection and needle tip visualized at all times during injection of medication 
 Paresthesia Pain: None  Heart Rate Change: No    Slow Fractionated Injection: 
Yes    Block outcome:  No apparent complications and patient tolerated procedure
 wellMedications AdministeredBupivacaine 0.25 % PF (mL), 20 mLbupivacaine 
liposome (EXPAREL), 20 mLKent Hospitalston KofmrxgzrDprxxt3593-03-75 09:24:31Alejandra Callejas MD     2019  9:25 AMAirwayPerformed by: Alejandra Callejas MDAuthorized
 by: Alejandra Callejas MD  Location:  ORUrgency:  ElectiveDifficult Airway: No  
Preoxygenated with 100% O2: Yes  C-spine Precautions Maintained Throughout: Yes 
 Mask Ventilation:  Easy maskFinal Airway Type:  Endotracheal airwayFinal 
Endotracheal Airway:  ETTCuffed: Yes  Technique Used:  Direct 
laryngoscopyDevices/Methods Used in Placement:  Intubating styletInsertion Site:
  OralBlade Type:  MillerLaryngoscope Blade/Videolaryngoscope Blade Size:  2ETT 
Size (mm):  7.0Cuff at minimum occlusion pressure: Yes  Measured from:  
GumsPlacement Verified by: CO2 detection and direct visualization  Laryngoscopic
 view:  Grade I - full view of glottisRapid Sequence Induction (RSI): No  
Modified RSI: No  Number of Attempts at Approach:  1HGila Regional Medical Centerelkin FlynnECG 
Pre/Post [id number] 07:39:41





             Test Item    Value        Reference Range Interpretation Comments

 

             Ventricular rate (test 67                                     



             code = 253)                                         

 

             Atrial rate (test code = 67                                     



             255)                                                

 

             ME interval (test code = 144                                    



             266)                                                

 

             QRSD interval (test code 78                                     



             = 260)                                              

 

             QT interval (test code = 432                                    



             264)                                                

 

             QTC interval (test code 456                                    



             = 265)                                              

 

             P axis 1 (test code = 74                                     



             267)                                                

 

             QRS axis 1 (test code = 72                                     



             268)                                                

 

             T wave axis (test code = 50                                     



             270)                                                

 

             EKG impression (test Normal sinus                           



             code = 273)  rhythm-Possible Left                           



                          atrial                                 



                          enlargement-Borderlin                           



                          e ECG-No previous                           



                          ECGs                                   



                          available-Electronica                           



                          lly Signed By                           



                          Jhon Lyman MD                           



                          ([id number]) on 2019                           



                          7:39:40 AM                             



[facility] MethodistType and [id number] 17:58:00





             Test Item    Value        Reference Range Interpretation Comments

 

             ABO grouping (test code = 883-9) O                                 

     

 

             Rh type (test code = 84339-6) POS                                  

  

 

             Antibody screen (gel) (test code = NEG                             

       



             890-4)                                              



[facility]ernando FlynnhCG qualitative, serum [id number] 15:36:22





             Test Item    Value        Reference Range Interpretation Comments

 

             hCG qualitative, Negative                               Sensitivity

 of HCG test:



             serum (test code =                                        25 mIU/mL



             8-8)                                             



Fernando Flynn

## 2020-11-28 NOTE — ER
Nurse's Notes                                                                                     

 [facility]                                                                 

Name: Dorothy Marcelino                                                                             

Age: 40 yrs                                                                                       

Sex: Female                                                                                       

: 1980                                                                                   

MRN: [id number]                                                                                   

Arrival Date: 2020                                                                          

Time: 16:30                                                                                       

Account#: [id number]                                                                            

Bed 8                                                                                             

Private MD:                                                                                       

Diagnosis: Generalized abdominal pain;Urinary tract infection, site not specified                 

                                                                                                  

Presentation:                                                                                     

                                                                                             

16:40 Chief complaint: Right flank pain and abdominal bloating, unrelieved by laxatives x 1   hb  

      week. Denies N/V. Coronavirus screen: At this time, the client does not indicate any        

      symptoms associated with coronavirus-19. Ebola Screen: No symptoms or risks identified      

      at this time. Initial Sepsis Screen: Does the patient meet any 2 criteria? No.              

      Patient's initial sepsis screen is negative. Does the patient have a suspected source       

      of infection? No. Patient's initial sepsis screen is negative. Risk Assessment: Do you      

      want to hurt yourself or someone else? Patient reports no desire to harm self or            

      others. Onset of symptoms was 2020.                                            

16:40 Method Of Arrival: Ambulatory                                                             

16:40 Acuity: EVI 3                                                                           hb  

                                                                                                  

OB/GYN:                                                                                           

18:43 LMP 2020                                                                             jl7 

                                                                                                  

Historical:                                                                                       

- Allergies:                                                                                      

20:35 adhesive tape;                                                                          ll2 

- PMHx:                                                                                           

20:35 Anxiety; Hypertension; mitral valve prolapse; MVP;                                      ll2 

                                                                                                  

- Immunization history:: Adult Immunizations up to date.                                          

- Social history:: Smoking status: Patient denies any tobacco usage or history of.                

                                                                                                  

                                                                                                  

Screenin:47 Abuse screen: Denies threats or abuse. Nutritional screening: No deficits noted.        tw2 

      Tuberculosis screening: No symptoms or risk factors identified. Fall Risk None              

      identified.                                                                                 

                                                                                                  

Assessment:                                                                                       

17:00 General: Appears in no apparent distress. uncomfortable, Behavior is cooperative,       jl7 

      appropriate for age, anxious. Pain: Complains of pain in right low back Pain currently      

      is 6 out of 10 on a pain scale. Neuro: Level of Consciousness is awake, alert, obeys        

      commands, Oriented to person, place, time, situation. Cardiovascular: Patient's skin is     

      warm and dry. Respiratory: Airway is patent Respiratory effort is even, unlabored,          

      Respiratory pattern is regular, symmetrical. GI: Reports bloating, Patient currently        

      denies diarrhea, flatulence, nausea, vomiting. : Reports urinary frequency. Derm:         

      Skin is pink, warm \T\ dry.                                                                 

18:00 Reassessment: Patient appears in no apparent distress at this time. No changes from     jl7 

      previously documented assessment. Patient and/or family updated on plan of care and         

      expected duration. Pain level reassessed. Patient is alert, oriented x 3, equal             

      unlabored respirations, skin warm/dry/pink.                                                 

19:00 Reassessment: Pt reports having intermittent right subscapular pain x 4 days, worsening jl7 

      with inspiration, ERP notified, see MAR for orders.                                         

19:20 Reassessment: Patient and/or family updated on plan of care and expected duration. Pain ll2 

      level reassessed. Patient is alert, oriented x 3, equal unlabored respirations, skin        

      warm/dry/pink.                                                                              

                                                                                                  

Vital Signs:                                                                                      

16:40  / 101; Pulse 89; Resp 16; Temp 98.3; Pulse Ox 100% on R/A; Pain 6/10;            hb  

18:00  / 87; Pulse 77; Resp 17; Pulse Ox 100% on R/A;                                   tw2 

                                                                                                  

ED Course:                                                                                        

16:30 Patient arrived in ED.                                                                  as  

16:34 Bed in low position. Call light in reach. Pulse ox on. NIBP on.                         tw2 

16:38 Aga Guevara FNP-C is PHCP.                                                          snw 

16:38 Wilner Tamez MD is Attending Physician.                                                snw 

16:42 Triage completed.                                                                       hb  

16:42 Arm band placed on.                                                                     hb  

16:44 Gurjit Friedman, NARAYAN is Primary Nurse.                                                      7 

16:50 Urine Culture Sent.                                                                     [facility] 

16:50 Urine Microscopic Only Sent.                                                            [facility] 

16:50 Urine collected: clean catch specimen, cloudy.                                          7 

17:00 Initial lab(s) drawn, by me, sent to lab. Inserted saline lock: 20 gauge in left        jl7 

      antecubital area, using aseptic technique. Blood collected.                                 

17:30 COVID swab sent to lab.                                                                 jl7 

19:11 CT Stone Protocol In Process Unspecified.                                               EDMS

19:24 Primary Nurse role handed off by Gurjit Friedman, NARAYAN                                       [facility] 

20:31 Hina Shelby, NARAYAN is Primary Nurse.                                                  ll2 

                                                                                                  

Administered Medications:                                                                         

17:00 Drug: Simethicone 240 mg Route: PO;                                                     [facility] 

18:00 Follow up: Response: No adverse reaction; No change in condition                        7 

17:10 Drug: TORadol 30 mg Route: IVP; Site: left antecubital;                                 jl7 

18:00 Follow up: Response: No adverse reaction; Pain is unchanged, physician notified         jl7 

18:10 Drug: Rocephin 1 grams Route: IV; Rate: calculated rate; Site: left antecubital;        jl7 

18:13 Follow up: Response: No adverse reaction; IV Status: Completed infusion                 jl7 

19:02 Drug: Ativan 1 mg Route: IVP; Site: left antecubital;                                   jl7 

20:00 Follow up: Response: No adverse reaction                                                ll2 

19:03 Drug: NS 0.9% 1000 ml Route: IV; Rate: 1 bolus; Site: left antecubital;                 jl7 

20:00 Follow up: Response: No adverse reaction; IV Status: Completed infusion; IV Intake:     ll2 

      1000ml                                                                                      

19:03 Drug: Bentyl 20 mg Route: PO;                                                           jl7 

20:00 Follow up: Response: No adverse reaction                                                ll2 

20:33 Drug: fentaNYL (PF) 25 mcg Route: IVP; Site: left antecubital;                          ll2 

20:34 Follow up: Response: Medication administered at discharge.; RASS: Alert and Calm (0)    ll2 

                                                                                                  

                                                                                                  

Intake:                                                                                           

20:00 IV: 1000ml; Total: 1000ml.                                                              ll2 

                                                                                                  

Outcome:                                                                                          

19:29 Discharge ordered by MD.                                                                snw 

20:35 Patient left the ED.                                                                    ll2 

                                                                                                  

Addendum:                                                                                         

2020                                                                                        

     17:08 Addendum: COVID-19 Result: Negative result given to RN to notify pt. Notified pt of     a
a5

           negative COVID 19 swab results. Pt advised that even with a negative test result they  

           should remain in isolation until symptom free for 3 days without medication. Pt also   

           advised to return to the ED for worsening symptoms.                                    

                                                                                                  

Signatures:                                                                                       

Dispatcher [employer]                           EDMS                                                 

Aga Guevara FNP-C                   FNP-Samantha Kiran Audri RN                     RN   aa5                                                  

Dorothy Genao RN                     RN   Effie Jaime RN                          RN   2                                                  

Missy Caputo                              5                                                  

Gurjit Friedman RN                        RN   jl7                                                  

Hina Shelby RN                    RN   ll2                                                  

                                                                                                  

Corrections: (The following items were deleted from the chart)                                    

                                                                                             

20:35 16:42 Allergies: adhesive tape; hb                                                      ll2 

20:35 16:42 PMHx: Anxiety; hb                                                                 ll2 

20:35 16:42 PMHx: Hypertension; hb                                                            ll2 

20:35 16:42 PMHx: mitral valve prolapse; hb                                                   ll2 

20:35 16:42 PMHx: MVP; hb                                                                     ll2 

                                                                                                  

**************************************************************************************************

## 2022-07-31 ENCOUNTER — HOSPITAL ENCOUNTER (EMERGENCY)
Dept: HOSPITAL 97 - ER | Age: 42
Discharge: HOME | End: 2022-07-31
Payer: COMMERCIAL

## 2022-07-31 VITALS — DIASTOLIC BLOOD PRESSURE: 100 MMHG | SYSTOLIC BLOOD PRESSURE: 119 MMHG

## 2022-07-31 VITALS — TEMPERATURE: 98.7 F | OXYGEN SATURATION: 100 %

## 2022-07-31 DIAGNOSIS — U07.1: Primary | ICD-10-CM

## 2022-07-31 DIAGNOSIS — Z91.048: ICD-10-CM

## 2022-07-31 DIAGNOSIS — F17.210: ICD-10-CM

## 2022-07-31 DIAGNOSIS — Z80.3: ICD-10-CM

## 2022-07-31 DIAGNOSIS — F41.1: ICD-10-CM

## 2022-07-31 LAB
BUN BLD-MCNC: 9 MG/DL (ref 7–18)
GLUCOSE SERPLBLD-MCNC: 153 MG/DL (ref 74–106)
HCT VFR BLD CALC: 38.2 % (ref 36–45)
LYMPHOCYTES # SPEC AUTO: 1.5 K/UL (ref 0.7–4.9)
MCV RBC: 92.5 FL (ref 80–100)
PMV BLD: 9 FL (ref 7.6–11.3)
POTASSIUM SERPL-SCNC: 3.7 MMOL/L (ref 3.5–5.1)
RBC # BLD: 4.13 M/UL (ref 3.86–4.86)

## 2022-07-31 PROCEDURE — 96374 THER/PROPH/DIAG INJ IV PUSH: CPT

## 2022-07-31 PROCEDURE — 99284 EMERGENCY DEPT VISIT MOD MDM: CPT

## 2022-07-31 PROCEDURE — 71045 X-RAY EXAM CHEST 1 VIEW: CPT

## 2022-07-31 PROCEDURE — 80048 BASIC METABOLIC PNL TOTAL CA: CPT

## 2022-07-31 PROCEDURE — 36415 COLL VENOUS BLD VENIPUNCTURE: CPT

## 2022-07-31 PROCEDURE — 85025 COMPLETE CBC W/AUTO DIFF WBC: CPT

## 2022-07-31 NOTE — EDPHYS
Physician Documentation                                                                           

 [facility]                                                                 

Name: Dorothy Marcelino                                                                             

Age: 42 yrs                                                                                       

Sex: Female                                                                                       

: 1980                                                                                   

MRN: [id number]                                                                                   

Arrival Date: 2022                                                                          

Time: 11:49                                                                                       

Account#: [id number]                                                                            

Bed 13                                                                                            

Private MD:                                                                                       

ED Physician Fiona Ramirez                                                                    

HPI:                                                                                              

                                                                                             

12:00 This 42 yrs old Female presents to ER via Ambulatory with complaints of Covid+,         jh7 

      Breathing Difficulty, Breast Problem.                                                       

12:00 Patient was diagnosed with COVID on Wednesday at [facility]. She states that her   jh7 

      only symptom now is some mild shortness of breath, but reports that it may be due to        

      anxiety. Reports that her mother was diagnosed with stage IV breast cancer recently.        

      Since then she has had some mild breast aching and an intermittent rash over both           

      breast. States that she has a lot of stressors and that her anxiety has increased.          

      States that she has been taking lorazepam as needed..                                       

                                                                                                  

OB/GYN:                                                                                           

13:00 LMP 2022                                                                           em6 

                                                                                                  

Historical:                                                                                       

- Allergies:                                                                                      

11:57 adhesive tape-silicones;                                                                ll1 

- PMHx:                                                                                           

11:57 Anxiety; Hypertension; mitral valve prolapse; MVP; DDD;                                 ll1 

- PSHx:                                                                                           

11:57 breast reduction;                                                                       ll1 

                                                                                                  

- Immunization history:: Adult Immunizations up to date.                                          

- Social history:: Smoking status: Patient reports the use of cigarette tobacco                   

  products, denies chronic smoking, but will smoke occasionally.                                  

                                                                                                  

                                                                                                  

ROS:                                                                                              

12:00 Constitutional: Negative for fever, chills, and weight loss, Eyes: Negative for injury, jh7 

      pain, redness, and discharge, ENT: Negative for injury, pain, and discharge, Neck:          

      Negative for injury, pain, and swelling, Cardiovascular: Negative for chest pain,           

      palpitations, and edema, Respiratory: Negative for shortness of breath, cough,              

      wheezing, and pleuritic chest pain, Abdomen/GI: Negative for abdominal pain, nausea,        

      vomiting, diarrhea, and constipation, : Negative for injury, bleeding, discharge, and     

      swelling.                                                                                   

12:00 Neuro: Negative for headache, weakness, numbness, tingling, and seizure.                    

12:00 Respiratory: Positive for shortness of breath, Negative for cough, wheezing.                

12:00 Skin: Positive for rash.                                                                    

12:00 All other systems are negative.                                                             

                                                                                                  

Exam:                                                                                             

12:00 Head/Face:  Normocephalic, atraumatic. Eyes:  Pupils equal round and reactive to light, jh7 

      extra-ocular motions intact.  Lids and lashes normal.  Conjunctiva and sclera are           

      non-icteric and not injected.  Cornea within normal limits.  Periorbital areas with no      

      swelling, redness, or edema. Neck:  Trachea midline, no thyromegaly or masses palpated,     

      and no cervical lymphadenopathy.  Supple, full range of motion without nuchal rigidity,     

      or vertebral point tenderness.  No Meningismus. Cardiovascular:  Regular rate and           

      rhythm with a normal S1 and S2.  No gallops, murmurs, or rubs.  Normal PMI, no JVD.  No     

      pulse deficits. Respiratory:  Lungs have equal breath sounds bilaterally, clear to          

      auscultation and percussion.  No rales, rhonchi or wheezes noted.  No increased work of     

      breathing, no retractions or nasal flaring. Abdomen/GI:  Soft, non-tender, with normal      

      bowel sounds.  No distension or tympany.  No guarding or rebound.  No evidence of           

      tenderness throughout. Back:  No spinal tenderness.  No costovertebral tenderness.          

      Full range of motion. Skin:  Warm, dry with normal turgor.  Normal color with no            

      rashes, no lesions, and no evidence of cellulitis. MS/ Extremity:  Pulses equal, no         

      cyanosis.  Neurovascular intact.  Full, normal range of motion. Neuro:  Awake and           

      alert, GCS 15, oriented to person, place, time, and situation. Sensory grossly intact.      

      Normal gait.                                                                                

12:00 Constitutional: The patient appears anxious, Crying                                         

12:00 Chest/axilla: Breasts: rash, that is mild in both breasts, Mild petechial rash noted on     

      the inferior portion of bilateral breast.  No masses palpated, induration, erythema,        

      drainage, or tenderness to palpation..                                                      

                                                                                                  

Vital Signs:                                                                                      

11:57  / 121; Pulse 86; Resp 20; Temp 98.7; Pulse Ox 100% ; Weight 58.97 kg; Height 5   ll1 

      ft. 1 in. (154.94 cm); Pain 5/10;                                                           

13:00  / 89; Pulse 78; Resp 20; Pulse Ox 100% on R/A;                                   em6 

13:54  / 100; Pulse 72; Resp 16; Pulse Ox 100% on R/A; Pain 0/10;                       em6 

11:57 Body Mass Index 24.56 (58.97 kg, 154.94 cm)                                             ll1 

                                                                                                  

MDM:                                                                                              

11:52 Patient medically screened.                                                             [facility] 

13:30 Differential diagnosis: viral Infection, bronchitis, pneumonia. Data reviewed: vital    [facility] 

      signs, nurses notes, radiologic studies, plain films. Data interpreted: Pulse oximetry:     

      is 100 %. Interpretation: normal. Counseling: I had a detailed discussion with the          

      patient and/or guardian regarding: the historical points, exam findings, and any            

      diagnostic results supporting the discharge/admit diagnosis, the need for outpatient        

      follow up, an OB/Gyne specialist, to return to the emergency department if symptoms         

      worsen or persist or if there are any questions or concerns that arise at home. ED          

      course: Strongly advised the patient to follow-up with an OB/GYN for a mammogram due to     

      patient concern and family history of breast cancer. The patient felt much better at        

      discharge and a referral for free clinic was provided to the patient..                      

                                                                                                  

                                                                                             

12:10 Order name: CBC with Diff; Complete Time: 13:23                                         [facility] 

                                                                                             

12:10 Order name: BMP; Complete Time: 13:23                                                   [facility] 

                                                                                             

12:08 Order name: Chest Single View XRAY; Complete Time: 13:23                                [facility] 

                                                                                                  

Administered Medications:                                                                         

12:40 Drug: Ativan (LORazepam) 1 mg Route: IVP; Site: left antecubital;                       em6 

                                                                                                  

                                                                                                  

Disposition Summary:                                                                              

22 13:30                                                                                    

Discharge Ordered                                                                                 

      Location: Home                                                                          [facility] 

      Problem: new                                                                            [facility] 

      Symptoms: have improved                                                                 [facility] 

      Condition: Stable                                                                       [facility] 

      Diagnosis                                                                                   

        - Coronavirus infection, unspecified                                                  [facility] 

        - Generalized anxiety disorder                                                        [facility] 

      Followup:                                                                               [facility] 

        - With: Private Physician                                                                  

        - When: 1 - 2 days                                                                         

        - Reason: Recheck today's complaints                                                       

      Discharge Instructions:                                                                     

        - Discharge Summary Sheet                                                             [facility] 

        - Mammogram                                                                           [facility] 

        - COVID-19                                                                            [facility] 

        - COVID-19 Frequently Asked Questions                                                 [facility] 

      Forms:                                                                                      

        - Medication Reconciliation Form                                                      [facility] 

        - Thank You Letter                                                                    [facility] 

      Prescriptions:                                                                              

        - ProAir HFA 90 mcg/actuation Inhalation HFA aerosol inhaler                               

            - inhale 2 puff by INHALATION route every 4-6 hours As needed; 1 Inhaler;         [facility] 

      Refills: 0, Product Selection Permitted                                                     

Signatures:                                                                                       

Dispatcher [employer]                           Jemima Sandoval RN                       RN   1                                                  

Rayna Boone FNP FNP  [facility]                                                  

Harshal, Ivy, RN                     RN   em6                                                  

                                                                                                  

Corrections: (The following items were deleted from the chart)                                    

13:49 12:00 This 42 yrs old Female presents to ER via Ambulatory with complaints of Covid+,   jh7 

      Breathing Difficulty, Breast Problem. jhMarline                                                   

                                                                                                  

**************************************************************************************************

## 2022-07-31 NOTE — RAD REPORT
EXAM DESCRIPTION:  RAD - Chest Single View - 7/31/2022 12:51 pm

 

CLINICAL HISTORY:  DYSPNEA

 

COMPARISON:  Chest Pa And Lat (2 Views) dated 3/23/2022; Chest Single View dated 5/20/2018

 

FINDINGS:  Lines: None.

Lungs: No evidence of edema or pneumonia.

Pleural: No significant pleural effusions or pneumothorax.

Cardiac: The heart size is within normal limits.

Bones: No acute fractures.

Other:

 

IMPRESSION:  No acute cardiopulmonary disease.

## 2022-07-31 NOTE — ER
Nurse's Notes                                                                                     

 [facility]                                                                 

Name: Dorothy Marcelino                                                                             

Age: 42 yrs                                                                                       

Sex: Female                                                                                       

: 1980                                                                                   

MRN: [id number]                                                                                   

Arrival Date: 2022                                                                          

Time: 11:49                                                                                       

Account#: [id number]                                                                            

Bed 13                                                                                            

Private MD:                                                                                       

Diagnosis: Coronavirus infection, unspecified;Generalized anxiety disorder                        

                                                                                                  

Presentation:                                                                                     

                                                                                             

11:57 Chief complaint: Patient states: Diagnosed with covid at [facility] on Wednesday.  ll1 

      Reports pain and busing to bilateral lower breasts this month. Started to cry during        

      exam, anxious or having breast CA. Ebola Screen: Patient denies travel to an                

      Ebola-affected area in the 21 days before illness onset. No symptoms or risks               

      identified at this time. Initial Sepsis Screen: Does the patient meet any 2 criteria?       

      No. Patient's initial sepsis screen is negative. Does the patient have a suspected          

      source of infection? No. Patient's initial sepsis screen is negative. Risk Assessment:      

      Do you want to hurt yourself or someone else? Patient reports no desire to harm self or     

      others. Onset of symptoms was 2022.                                                 

11:57 Method Of Arrival: Ambulatory                                                           ll1 

11:57 Acuity: EVI 3                                                                           ll1 

12:00 Coronavirus screen: Client presents with at least one sign or symptom that may indicate em6 

      coronavirus-19. Standard/surgical mask placed on the client. Provider contacted for         

      isolation considerations.                                                                   

                                                                                                  

Triage Assessment:                                                                                

11:59 General: Appears distressed, uncomfortable, Behavior is anxious. Pain: Complains of     ll1 

      pain in breasts Pain Quality of pain is described as aching. Respiratory: Reports cough     

      that is Onset: The symptoms/episode began/occurred 5 days, the patient has mild             

      shortness of breath.                                                                        

                                                                                                  

OB/GYN:                                                                                           

13:00 LMP 2022                                                                           em6 

                                                                                                  

Historical:                                                                                       

- Allergies:                                                                                      

11:57 adhesive tape-silicones;                                                                ll1 

- PMHx:                                                                                           

11:57 Anxiety; Hypertension; mitral valve prolapse; MVP; DDD;                                 ll1 

- PSHx:                                                                                           

11:57 breast reduction;                                                                       ll1 

                                                                                                  

- Immunization history:: Adult Immunizations up to date.                                          

- Social history:: Smoking status: Patient reports the use of cigarette tobacco                   

  products, denies chronic smoking, but will smoke occasionally.                                  

                                                                                                  

                                                                                                  

Screenin:13 Abuse screen: Denies threats or abuse. Nutritional screening: No deficits noted.        em6 

      Tuberculosis screening: No symptoms or risk factors identified. Fall Risk None              

      identified. No fall in past 12 months (0 pts). No secondary diagnosis (0 pts). IV           

      access (20 points). Ambulatory Aid- None/Bed Rest/Nurse Assist (0 pts). Gait-               

      Normal/Bed Rest/Wheelchair (0 pts) Mental Status- Oriented to own ability (0 pts).          

      Total Kaur Fall Scale indicates No Risk (0-24 pts).                                        

                                                                                                  

Assessment:                                                                                       

12:00 General: Appears in no apparent distress. comfortable, Behavior is cooperative,         em6 

      anxious, crying.                                                                            

12:00 Pain: Denies pain. Neuro: Owen Agitation-Sedation Scale (RASS): +1 Restless Level   em6 

      of Consciousness is awake, alert, obeys commands, Oriented to person, place, time,          

      situation. Cardiovascular: Reports shortness of breath, Denies chest pain,                  

      lightheadedness, Heart tones present Capillary refill < 3 seconds Patient's skin is         

      warm and dry. Respiratory: Reports shortness of breath since 2022 Airway is patent     

      Respiratory effort is even, unlabored, Breath sounds are clear bilaterally. GI: No          

      signs and/or symptoms were reported involving the gastrointestinal system. : No signs     

      and/or symptoms were reported regarding the genitourinary system. EENT: No signs and/or     

      symptoms were reported regarding the EENT system. Derm: No signs and/or symptoms            

      reported regarding the dermatologic system. Musculoskeletal: No signs and/or symptoms       

      reported regarding the musculoskeletal system.                                              

13:53 Reassessment: Patient appears in no apparent distress at this time. Patient and/or      em6 

      family updated on plan of care and expected duration. Pain level reassessed. Patient        

      states feeling better.                                                                      

                                                                                                  

Vital Signs:                                                                                      

11:57  / 121; Pulse 86; Resp 20; Temp 98.7; Pulse Ox 100% ; Weight 58.97 kg; Height 5   ll1 

      ft. 1 in. (154.94 cm); Pain 5/10;                                                           

13:00  / 89; Pulse 78; Resp 20; Pulse Ox 100% on R/A;                                   em6 

13:54  / 100; Pulse 72; Resp 16; Pulse Ox 100% on R/A; Pain 0/10;                       em6 

11:57 Body Mass Index 24.56 (58.97 kg, 154.94 cm)                                             ll1 

                                                                                                  

ED Course:                                                                                        

11:49 Patient arrived in ED.                                                                  mr  

11:51 NguyenDanny, RN is Primary Nurse.                                                  lars 

11:51 [facility]one [facility] is [facility]                                                          [place] 

11:51 Fiona Ramirez MD is Attending Physician.                                           [facility] 

11:57 Arm band placed on Patient placed in an exam room, on a stretcher.                      ll1 

11:59 Triage completed.                                                                       ll1 

12:00 Inserted saline lock: 20 gauge in right antecubital area, using aseptic technique.      em6 

      Blood collected.                                                                            

12:53 Chest Single View XRAY In Process Unspecified.                                          EDMS

13:14 Patient has correct armband on for positive identification. Bed in low position. Call   em6 

      light in reach. Side rails up X2. Pulse ox on. NIBP on.                                     

13:55 No provider procedures requiring assistance completed.                                  em6 

13:55 IV discontinued, intact, bleeding controlled, No redness/swelling at site. Pressure     em6 

      dressing applied.                                                                           

                                                                                                  

Administered Medications:                                                                         

12:40 Drug: Ativan (LORazepam) 1 mg Route: IVP; Site: left antecubital;                       em6 

                                                                                                  

                                                                                                  

Medication:                                                                                       

13:14 VIS not applicable for this client.                                                     em6 

                                                                                                  

Outcome:                                                                                          

13:30 Discharge ordered by MD.                                                                [facility] 

13:56 Discharged to home ambulatory, with family.                                             em6 

13:56 Condition: stable                                                                           

13:56 Discharge instructions given to patient, family, Instructed on discharge instructions,      

      follow up and referral plans. medication usage, Demonstrated understanding of               

      instructions, follow-up care, medications, Prescriptions given X 1.                         

13:58 Patient left the ED.                                                                    em6 

                                                                                                  

Signatures:                                                                                       

Dispatcher [employer]                           [facility]                                                 

Ruchi Linder                                 mr                                                   

NguyenDanny, Jemima Astudillo RN RN                       RN   1                                                  

Rayna Boone FNP FNP [id number] Martinez, Erika RN                     RN   em6                                                  

                                                                                                  

**************************************************************************************************

## 2024-11-08 ENCOUNTER — HOSPITAL ENCOUNTER (EMERGENCY)
Dept: HOSPITAL 97 - ER | Age: 44
Discharge: HOME | End: 2024-11-08
Payer: COMMERCIAL

## 2024-11-08 VITALS — TEMPERATURE: 97.5 F | OXYGEN SATURATION: 100 %

## 2024-11-08 VITALS — SYSTOLIC BLOOD PRESSURE: 170 MMHG | DIASTOLIC BLOOD PRESSURE: 109 MMHG

## 2024-11-08 DIAGNOSIS — L50.9: Primary | ICD-10-CM

## 2024-11-08 DIAGNOSIS — I10: ICD-10-CM

## 2024-11-08 DIAGNOSIS — M25.521: ICD-10-CM

## 2024-11-08 LAB
ALBUMIN SERPL BCP-MCNC: 4 G/DL (ref 3.4–5)
ALBUMIN/GLOB SERPL: 1.1 {RATIO} (ref 1.1–1.8)
ALP SERPL-CCNC: 61 U/L (ref 45–117)
ALT SERPL W P-5'-P-CCNC: 39 U/L (ref 13–56)
ANION GAP SERPL CALC-SCNC: 8.6 MEQ/L (ref 5–15)
AST SERPL W P-5'-P-CCNC: 21 U/L (ref 15–37)
BILIRUB INDIRECT SERPL-MCNC: 0.2 MG/DL (ref 0.2–0.8)
BUN BLD-MCNC: 18 MG/DL (ref 7–18)
GLOBULIN SER CALC-MCNC: 3.8 G/DL (ref 2.3–3.5)
GLUCOSE SERPLBLD-MCNC: 86 MG/DL (ref 74–106)
HCT VFR BLD CALC: 38.6 % (ref 36–45)
HGB BLD-MCNC: 13.3 G/DL (ref 12–15)
INR BLD: 1.01
LYMPHOCYTES # SPEC AUTO: 2.6 K/UL (ref 0.7–4.9)
MAGNESIUM SERPL-MCNC: 2 MG/DL (ref 1.6–2.4)
MCH RBC QN AUTO: 31.8 PG (ref 27–35)
MCHC RBC AUTO-ENTMCNC: 34.4 G/DL (ref 32–36)
MCV RBC: 92.4 FL (ref 80–100)
NRBC # BLD: 0 10*3/UL (ref 0–0)
NRBC BLD AUTO-RTO: 0.1 % (ref 0–0)
PMV BLD: 8.3 FL (ref 7.6–11.3)
POTASSIUM SERPL-SCNC: 3.6 MEQ/L (ref 3.5–5.1)
PROTHROMBIN TIME: 11.3 SECONDS (ref 9.4–12.5)
RBC # BLD: 4.18 M/UL (ref 3.86–4.86)
WBC # BLD AUTO: 7.4 THOU/UL (ref 4.3–10.9)

## 2024-11-08 PROCEDURE — 99284 EMERGENCY DEPT VISIT MOD MDM: CPT

## 2024-11-08 PROCEDURE — 80076 HEPATIC FUNCTION PANEL: CPT

## 2024-11-08 PROCEDURE — 80048 BASIC METABOLIC PNL TOTAL CA: CPT

## 2024-11-08 PROCEDURE — 73080 X-RAY EXAM OF ELBOW: CPT

## 2024-11-08 PROCEDURE — 85610 PROTHROMBIN TIME: CPT

## 2024-11-08 PROCEDURE — 36415 COLL VENOUS BLD VENIPUNCTURE: CPT

## 2024-11-08 PROCEDURE — 93971 EXTREMITY STUDY: CPT

## 2024-11-08 PROCEDURE — 85025 COMPLETE CBC W/AUTO DIFF WBC: CPT

## 2024-11-08 PROCEDURE — 83735 ASSAY OF MAGNESIUM: CPT

## 2024-11-08 PROCEDURE — 96375 TX/PRO/DX INJ NEW DRUG ADDON: CPT

## 2024-11-08 PROCEDURE — 96374 THER/PROPH/DIAG INJ IV PUSH: CPT
